# Patient Record
Sex: FEMALE | Race: WHITE | NOT HISPANIC OR LATINO
[De-identification: names, ages, dates, MRNs, and addresses within clinical notes are randomized per-mention and may not be internally consistent; named-entity substitution may affect disease eponyms.]

---

## 2023-05-05 PROBLEM — Z00.00 ENCOUNTER FOR PREVENTIVE HEALTH EXAMINATION: Status: ACTIVE | Noted: 2023-05-05

## 2023-05-09 ENCOUNTER — NON-APPOINTMENT (OUTPATIENT)
Age: 48
End: 2023-05-09

## 2023-05-12 ENCOUNTER — APPOINTMENT (OUTPATIENT)
Dept: BREAST CENTER | Facility: CLINIC | Age: 48
End: 2023-05-12
Payer: COMMERCIAL

## 2023-05-12 ENCOUNTER — NON-APPOINTMENT (OUTPATIENT)
Age: 48
End: 2023-05-12

## 2023-05-12 VITALS
HEART RATE: 83 BPM | OXYGEN SATURATION: 99 % | HEIGHT: 61 IN | BODY MASS INDEX: 37.76 KG/M2 | WEIGHT: 200 LBS | TEMPERATURE: 97.6 F

## 2023-05-12 DIAGNOSIS — Z86.39 PERSONAL HISTORY OF OTHER ENDOCRINE, NUTRITIONAL AND METABOLIC DISEASE: ICD-10-CM

## 2023-05-12 DIAGNOSIS — Z80.3 FAMILY HISTORY OF MALIGNANT NEOPLASM OF BREAST: ICD-10-CM

## 2023-05-12 PROCEDURE — 99205 OFFICE O/P NEW HI 60 MIN: CPT

## 2023-05-12 RX ORDER — LEVOTHYROXINE SODIUM 88 UG/1
88 TABLET ORAL
Refills: 0 | Status: ACTIVE | COMMUNITY

## 2023-05-12 NOTE — REASON FOR VISIT
[Initial Evaluation] : an initial evaluation [FreeTextEntry1] : The patient is a perimenopausal white female of [place] and [place] descent with a strong family history of breast cancer who was found to have suspicious calcifications in her right breast upper outer quadrant on screening mammography and stereotactic biopsy in May 2023 showed high-grade DCIS with comedonecrosis which was ER/TN positive.  She comes in now for a surgical second opinion.

## 2023-05-12 NOTE — PHYSICAL EXAM
[Normocephalic] : normocephalic [Atraumatic] : atraumatic [EOMI] : extra ocular movement intact [Supple] : supple [No Supraclavicular Adenopathy] : no supraclavicular adenopathy [No Cervical Adenopathy] : no cervical adenopathy [Examined in the supine and seated position] : examined in the supine and seated position [No dominant masses] : no dominant masses left breast [No Nipple Retraction] : no right nipple retraction [No Nipple Discharge] : no left nipple discharge [Breast Mass Left Breast ___cm] : no masses [Breast Nipple Inversion] : nipples not inverted [Breast Nipple Retraction] : nipples not retracted [Breast Nipple Flattening] : nipples not flattened [Breast Nipple Fissures] : nipples not fissured [Breast Abnormal Lactation (Galactorrhea)] : no galactorrhea [Breast Abnormal Secretion Bloody Fluid] : no bloody discharge [Breast Abnormal Secretion Serous Fluid] : no serous discharge [Breast Abnormal Secretion Opalescent Fluid] : no milky discharge [No Axillary Lymphadenopathy] : no left axillary lymphadenopathy [No Edema] : no edema [No Rashes] : no rashes [No Ulceration] : no ulceration [de-identified] : On exam, the patient has ptotic D-cup breasts.  On palpation, she has some scarring changes from her recent stereotactic core biopsy in the right breast upper outer quadrant with some bruising.  She has no other suspicious findings and no axillary, supraclavicular, or cervical adenopathy. [de-identified] : Bruising and scarring changes in the right breast upper outer quadrant from her recent stereotactic core biopsy showing DCIS. [de-identified] : Right breast upper outer quadrant biopsy site changes.

## 2023-05-12 NOTE — ADDENDUM
[FreeTextEntry1] : I spent greater than 75% of the consultation in face-to-face counseling and coordination care for this patient with a newly diagnosed right breast upper outer quadrant DCIS.

## 2023-05-12 NOTE — HISTORY OF PRESENT ILLNESS
[FreeTextEntry1] : The patient is a 47-year-old  perimenopausal white female of [place] and [place] descent.  She underwent menarche at age 14 and had her first child at age 31.  Her menses are somewhat irregular and she is having some vasomotor symptoms.  She has a strong family history with her maternal grandmother who had breast cancer at age 77 and her paternal grandmother had breast cancer in her 60s.  She has a maternal great-grandmother who also had breast cancer at an unknown age.  The patient underwent a screening mammography at [facility] on 2023 showing some new suspicious calcifications in her right breast upper outer quadrant.  Diagnostic mammography in 2023 also showed these calcifications and she underwent a stereotactic core biopsy on May 3, 2023 which showed extensive high-grade DCIS with comedonecrosis associated with calcifications which was ER positive at 100% and AR positive at 5% ("ar" clip).  She has sought other surgical opinions and comes in now for another opinion.

## 2023-05-17 ENCOUNTER — NON-APPOINTMENT (OUTPATIENT)
Age: 48
End: 2023-05-17

## 2023-05-17 ENCOUNTER — TRANSCRIPTION ENCOUNTER (OUTPATIENT)
Age: 48
End: 2023-05-17

## 2023-05-23 ENCOUNTER — RESULT REVIEW (OUTPATIENT)
Age: 48
End: 2023-05-23

## 2023-05-24 ENCOUNTER — NON-APPOINTMENT (OUTPATIENT)
Age: 48
End: 2023-05-24

## 2023-05-26 ENCOUNTER — NON-APPOINTMENT (OUTPATIENT)
Age: 48
End: 2023-05-26

## 2023-05-26 DIAGNOSIS — R93.89 ABNORMAL FINDINGS ON DIAGNOSTIC IMAGING OF OTHER SPECIFIED BODY STRUCTURES: ICD-10-CM

## 2023-05-30 ENCOUNTER — NON-APPOINTMENT (OUTPATIENT)
Age: 48
End: 2023-05-30

## 2023-05-30 ENCOUNTER — APPOINTMENT (OUTPATIENT)
Dept: PLASTIC SURGERY | Facility: CLINIC | Age: 48
End: 2023-05-30
Payer: COMMERCIAL

## 2023-05-30 VITALS — WEIGHT: 200 LBS | BODY MASS INDEX: 37.79 KG/M2

## 2023-05-30 PROCEDURE — 99203 OFFICE O/P NEW LOW 30 MIN: CPT

## 2023-05-31 NOTE — HISTORY OF PRESENT ILLNESS
[FreeTextEntry1] : 46 y/o female presents for initial consultation referred by Dr. Kelly for right breast reconstruction after partial mastectomy vs mastectomy\par Biopsy and MRI was done resulting in DCIS right breast\par Family history of breast cancer both maternal and paternal grandmothers\par No history of bleeding/ clotting disorders\par \par Bra Size 42DD, Bilateral macromastia with grade 3 ptosis. Breast soft, no palpable masses, no scars. Bilateral nipples everted with no signs of discharge.\par Abdomen NT, ND, no masses, no scars, + laxity, + adipose tissue\par \par Today we discussed all options for breast reconstruction including no reconstruction, local tissue transfer with left breast reduction and reconstruction using tissue expanders and implants as well as autologous reconstruction. The nature of each surgery, its risks, benefits, alternatives, expected postoperative course, recovery and long-term results were discussed in detail. All questions were answered.\par \par Ms. Coronado is most interested in segmental breast resection and reconstruction with a wise pattern tissue rearrangement. This is expected to result in a significant asymmetry between the breasts. Will also plan to perform contralateral breast reduction at the same time to preserve breast symmetry. Will coordinate surgery date with Dr. Kelly.\par \par \par \par \par

## 2023-06-02 ENCOUNTER — NON-APPOINTMENT (OUTPATIENT)
Age: 48
End: 2023-06-02

## 2023-06-06 ENCOUNTER — RESULT REVIEW (OUTPATIENT)
Age: 48
End: 2023-06-06

## 2023-06-08 ENCOUNTER — NON-APPOINTMENT (OUTPATIENT)
Age: 48
End: 2023-06-08

## 2023-06-16 ENCOUNTER — APPOINTMENT (OUTPATIENT)
Dept: PLASTIC SURGERY | Facility: CLINIC | Age: 48
End: 2023-06-16
Payer: COMMERCIAL

## 2023-06-16 ENCOUNTER — APPOINTMENT (OUTPATIENT)
Dept: PLASTIC SURGERY | Facility: CLINIC | Age: 48
End: 2023-06-16

## 2023-06-16 PROCEDURE — 99451 NTRPROF PH1/NTRNET/EHR 5/>: CPT

## 2023-06-16 PROCEDURE — 99213 OFFICE O/P EST LOW 20 MIN: CPT | Mod: 95

## 2023-06-16 NOTE — HISTORY OF PRESENT ILLNESS
[FreeTextEntry1] : Patient name: MARY JANE TAPIA \par : Oct 26 1975 \par Date:2023 \par Attending:Dr. Bates\par \par \par HPI: Patient presented for preop for right breast tissue rearrangement and possible left breast reduction on 23\par \par \par Allergies: Calamine Lotion\par \par Medications Prescribed: Percocet\par \par \par ROS: Complete 14 point review of systems negative except pertinent items reviewed in the HPI. Other Non-contributory items reviewed in our new patient questionnaire and we have submitted it to be scanned into the medical record.\par \par PE: Completed at time of in office evaluation\par \par Assessment/Plan: We have discussed pre and post operative course including but not limited to instructions, recovery limitations and expectations, ERAS protocol, NPO status, transportation home, post op medications and pain management, benefits and risks of the surgical procedure. Pre op labs reviewed. They patient would like to proceed with surgery as discussed and planned.\par \par \par Rosibel Stiles NP\par \par \par

## 2023-06-16 NOTE — REASON FOR VISIT
[Home] : at home, [unfilled] , at the time of the visit. [Medical Office: ([facility])___] : at the medical office located in  [Parents] : parents [Patient] : the patient [Follow-Up: _____] : a [unfilled] follow-up visit

## 2023-06-18 ENCOUNTER — RESULT REVIEW (OUTPATIENT)
Age: 48
End: 2023-06-18

## 2023-06-21 ENCOUNTER — RESULT REVIEW (OUTPATIENT)
Age: 48
End: 2023-06-21

## 2023-06-21 ENCOUNTER — NON-APPOINTMENT (OUTPATIENT)
Age: 48
End: 2023-06-21

## 2023-06-23 ENCOUNTER — RESULT REVIEW (OUTPATIENT)
Age: 48
End: 2023-06-23

## 2023-06-23 ENCOUNTER — APPOINTMENT (OUTPATIENT)
Dept: BREAST CENTER | Facility: HOSPITAL | Age: 48
End: 2023-06-23

## 2023-06-26 ENCOUNTER — TRANSCRIPTION ENCOUNTER (OUTPATIENT)
Age: 48
End: 2023-06-26

## 2023-06-27 ENCOUNTER — NON-APPOINTMENT (OUTPATIENT)
Age: 48
End: 2023-06-27

## 2023-06-28 ENCOUNTER — NON-APPOINTMENT (OUTPATIENT)
Age: 48
End: 2023-06-28

## 2023-06-28 ENCOUNTER — APPOINTMENT (OUTPATIENT)
Dept: PLASTIC SURGERY | Facility: CLINIC | Age: 48
End: 2023-06-28
Payer: COMMERCIAL

## 2023-06-28 DIAGNOSIS — R21 RASH AND OTHER NONSPECIFIC SKIN ERUPTION: ICD-10-CM

## 2023-06-28 PROCEDURE — 99024 POSTOP FOLLOW-UP VISIT: CPT

## 2023-06-30 ENCOUNTER — APPOINTMENT (OUTPATIENT)
Dept: BREAST CENTER | Facility: CLINIC | Age: 48
End: 2023-06-30
Payer: COMMERCIAL

## 2023-06-30 PROCEDURE — 99024 POSTOP FOLLOW-UP VISIT: CPT

## 2023-06-30 NOTE — REASON FOR VISIT
[Post Op: _________] : a [unfilled] post op visit [FreeTextEntry1] : The patient is a perimenopausal white female of Estonian and Welsh descent with a strong family history of breast cancer who was found to have suspicious calcifications in her right breast upper outer quadrant on screening mammography and stereotactic biopsy in May 2023 showed high-grade DCIS with comedonecrosis which was ER/ME positive.  MRI performed in May 2023 showed the biopsy-proven 12:00 DCIS but significant non-mass-like enhancement extending 10 cm posteriorly and separate more posterior MRI guided core biopsy performed in June 2023 showed more extensive DCIS again in this 12:00 but posterior region.  A right breast bracketed partial mastectomy with sentinel lymph node biopsy and tissue rearrangement closure by Dr. Bates was performed on June 23, 2023.  Final pathology showed intermediate to high-grade DCIS with 3 sentinel lymph nodes with 1 having some rare isolated tumor cells but there was no invasive cancer and margins were free but she did have an anterior margin less than 1 mm.  She comes in now for postop follow-up.

## 2023-06-30 NOTE — HISTORY OF PRESENT ILLNESS
[FreeTextEntry1] : The patient is a 47-year-old  perimenopausal white female of Israeli and Sudanese descent.  She underwent menarche at age 14 and had her first child at age 31.  Her menses are somewhat irregular and she is having some vasomotor symptoms.  She has a strong family history with her maternal grandmother who had breast cancer at age 77 and her paternal grandmother had breast cancer in her 60s.  She has a maternal great-grandmother who also had breast cancer at an unknown age.  The patient underwent a screening mammography at [facility] on 2023 showing some new suspicious calcifications in her right breast upper outer quadrant.  Diagnostic mammography in 2023 also showed these calcifications and she underwent a stereotactic core biopsy on May 3, 2023 which showed extensive high-grade DCIS with comedonecrosis associated with calcifications which was ER positive at 100% and MT positive at 5% ("ar" clip).  She sought other surgical opinions.  She underwent a bilateral breast MRI on May 24, 2023 showing the biopsy-proven DCIS in the right breast 12:00 region with significant clumped non-mass-like enhancement extending posteriorly 10.3 cm for which a separate MRI guided core biopsy was recommended.  She also had some findings in the right lobe of the liver.  Abdominal MRI showed this right hepatic lobe density to be consistent with a hemangioma.  Separate posterior right breast 12:00 MRI guided core biopsy was performed on 2023 at [place] which showed more extensive DCIS.  She was still felt to be a candidate for partial mastectomy and opted on breast conservation and was seen by Dr. Bates preoperatively who felt she is a good candidate for a reduction mastopexy versus tissue rearrangement.  She underwent a right breast bracketed partial mastectomy with tissue rearrangement closure and sentinel lymph node biopsy performed on 2023 with Dr. Bates.  The final pathology showed intermediate to high-grade DCIS and there were 3 sentinel lymph nodes with 1 having some rare isolated tumor cells.  This would be considered node-negative and still considered stage 0.  There was no invasive cancer but anterior margin was less than 1 mm.  She comes in now for postop follow-up.

## 2023-06-30 NOTE — ASSESSMENT
[FreeTextEntry1] : The patient is a 47-year-old  perimenopausal white female of [place] and [place] descent.  She underwent menarche at age 14 and had her first child at age 31.  Her menses are somewhat irregular and she is having some vasomotor symptoms.  She has a strong family history with her maternal grandmother who had breast cancer at age 77 and her paternal grandmother had breast cancer in her 60s.  She has a maternal great-grandmother who also had breast cancer at an unknown age.  The patient underwent a screening mammography at [facility] on 2023 showing some new suspicious calcifications in her right breast upper outer quadrant.  Diagnostic mammography in 2023 also showed these calcifications and she underwent a stereotactic core biopsy on May 3, 2023 which showed extensive high-grade DCIS with comedonecrosis associated with calcifications which was ER positive at 100% and VT positive at 5% ("ar" clip).  I reviewed her imaging on CD-ROM and indeed she has the suspicious right breast upper outer quadrant calcifications with another area of calcifications about 1 cm medial to the biopsied region.  I do not see any suspicious findings in the left breast.  On exam she just has postbiopsy changes in the right breast upper outer quadrant and no suspicious adenopathy.  I spoke to the patient with her entire family with her  as well as mother and father involved in the discussion.  She understands that this is a stage 0 cancer at this point but is high-grade and has a chance of having some associated invasive component.  I also spoke to her about the possible need for a lymph node evaluation with a sentinel lymph node biopsy and recommended it at the same time of the partial mastectomy due to a possible 20 to 30% risk of being upstaged to an invasive cancer and I recommended the sentinel node biopsy at the time of surgery.  Genetic panel testing performed at [place] was reportedly negative.   She underwent a bilateral breast MRI on May 24, 2023 showing the biopsy-proven DCIS in the right breast 12:00 region with significant clumped non-mass-like enhancement extending posteriorly 10.3 cm for which a separate MRI guided core biopsy was recommended.  She also had some findings in the right lobe of the liver.  Abdominal MRI showed this right hepatic lobe density to be consistent with a hemangioma.  Separate posterior right breast 12:00 MRI guided core biopsy was performed on 2023 at [place] which showed more extensive DCIS.  She was still felt to be a candidate for partial mastectomy and opted on breast conservation and was seen by Dr. Bates preoperatively who felt she is a good candidate for a reduction mastopexy versus tissue rearrangement.  She underwent a right breast bracketed partial mastectomy with tissue rearrangement closure and sentinel lymph node biopsy performed on 2023 with Dr. Bates.  The final pathology showed intermediate to high-grade DCIS and there were 3 sentinel lymph nodes with 1 having some rare isolated tumor cells.  This would be considered node-negative and still considered stage 0.  There was no invasive cancer but anterior margin was less than 1 mm.  On exam today, she is healing well from her right breast partial mastectomy and tissue rearrangement with sentinel lymph node biopsy.  I did aspirate about 70 cc of seroma from underneath the wide excision site under ultrasound guidance in the office today. I went over the pathology with the patient and gave her a copy of the report for her records.  Given this close anterior margin I would like to take her back to the operating room for further anterior margin excision.  I had to undermine the superior aspect of the nipple areolar complex in this case preventing a full reduction mastopexy and tissue rearrangement closure was performed by plastic surgery which will make it easier to find this anterior margin.  Did speak to Dr. Carias regarding his tissue rearrangement and he feels that I could just reopen the incision and get an anterior margin without fully disturbing the tissue rearrangement underneath.  The patient understands the need for this further reexcision and I will get her back on the schedule as soon as possible.  She understands that since all her disease was intraductal, chemotherapy would not be recommended.  I would like her to see the medical oncologist however postoperatively just to talk about endocrine therapy since this DCIS was ER positive greater than 95% and VT positive at 40% on the final pathology.  I did place fiducial titanium clips at the base of the excision to aid the radiation oncologist and she understands the need for postoperative external beam radiation.

## 2023-06-30 NOTE — PHYSICAL EXAM
[Normocephalic] : normocephalic [Atraumatic] : atraumatic [EOMI] : extra ocular movement intact [Supple] : supple [No Supraclavicular Adenopathy] : no supraclavicular adenopathy [No Cervical Adenopathy] : no cervical adenopathy [Examined in the supine and seated position] : examined in the supine and seated position [No dominant masses] : no dominant masses left breast [No Nipple Retraction] : no right nipple retraction [No Nipple Discharge] : no left nipple discharge [Breast Mass Left Breast ___cm] : no masses [No Axillary Lymphadenopathy] : no left axillary lymphadenopathy [No Edema] : no edema [No Rashes] : no rashes [No Ulceration] : no ulceration [Breast Mass Right Breast ___cm] : no masses [Breast Nipple Inversion] : nipples not inverted [Breast Nipple Retraction] : nipples not retracted [Breast Nipple Flattening] : nipples not flattened [Breast Nipple Fissures] : nipples not fissured [Breast Abnormal Lactation (Galactorrhea)] : no galactorrhea [Breast Abnormal Secretion Bloody Fluid] : no bloody discharge [Breast Abnormal Secretion Serous Fluid] : no serous discharge [Breast Abnormal Secretion Opalescent Fluid] : no milky discharge [de-identified] : Status post bracketed partial mastectomy with tissue rearrangement closure by plastic surgery and right axillary sentinel lymph node biopsy.  Wounds are healing well with no signs of any infection or hematoma and I did aspirate 70 cc of seroma from underneath the wide excision site in the office today under ultrasound guidance. [de-identified] : On exam, the patient is doing well after her right breast partial mastectomy with sentinel lymph node biopsy and tissue rearrangement closure.  The wounds are healing well with no signs of any infection or hematoma.  I did aspirate 70 cc of seroma from underneath the wide excision site in the office under ultrasound guidance today.

## 2023-07-05 NOTE — HISTORY OF PRESENT ILLNESS
[FreeTextEntry1] : 46 y/o female presents POD#5 days s/p right breast tissue rearrangement on 6/23/23. Patient denies any f/c/n/v. Patient complaining of pruritic rash of chest. She otherwise is doing well, no complaints offered\par \par PE: Right breast healing well. Incision C/D/I. No collection of infection. No wound breakdown\par Diffuse raised erythematous bulbous rash chest wall\par \par Rx Clobetasol ointment and Benadryl\par If does not resolve patient to see derm\par Reviewed care and course\par Answered all questions\par RTC 2 weeks.\par

## 2023-07-06 ENCOUNTER — NON-APPOINTMENT (OUTPATIENT)
Age: 48
End: 2023-07-06

## 2023-07-10 ENCOUNTER — NON-APPOINTMENT (OUTPATIENT)
Age: 48
End: 2023-07-10

## 2023-07-11 ENCOUNTER — APPOINTMENT (OUTPATIENT)
Dept: PLASTIC SURGERY | Facility: CLINIC | Age: 48
End: 2023-07-11

## 2023-07-17 ENCOUNTER — APPOINTMENT (OUTPATIENT)
Dept: BREAST CENTER | Facility: HOSPITAL | Age: 48
End: 2023-07-17

## 2023-07-17 ENCOUNTER — RESULT REVIEW (OUTPATIENT)
Age: 48
End: 2023-07-17

## 2023-07-22 ENCOUNTER — NON-APPOINTMENT (OUTPATIENT)
Age: 48
End: 2023-07-22

## 2023-07-22 NOTE — REASON FOR VISIT
[Post Op: _________] : a [unfilled] post op visit [FreeTextEntry1] : The patient is a perimenopausal white female of Occ[place] and Irish descent with a strong family history of breast cancer who was found to have suspicious calcifications in her right breast upper outer quadrant on screening mammography and stereotactic biopsy in May 2023 showed high-grade DCIS with comedonecrosis which was ER/KS positive.  MRI performed in May 2023 showed the biopsy-proven 12:00 DCIS but significant non-mass-like enhancement extending 10 cm posteriorly and separate more posterior MRI guided core biopsy performed in June 2023 showed more extensive DCIS again in this 12:00 but posterior region.  A right breast bracketed partial mastectomy with sentinel lymph node biopsy and tissue rearrangement closure by Dr. Bates was performed on June 23, 2023.  Final pathology showed intermediate to high-grade DCIS with 3 sentinel lymph nodes with 1 having some rare isolated tumor cells but there was no invasive cancer and margins were free but she did have an anterior margin less than 1 mm.  She underwent reexcision of the anterior margin on July 17, 2023.  She comes in now for postop follow-up.

## 2023-07-22 NOTE — ASSESSMENT
[FreeTextEntry1] : The patient is a 47-year-old  perimenopausal white female of [place] and [place] descent.  She underwent menarche at age 14 and had her first child at age 31.  Her menses are somewhat irregular and she is having some vasomotor symptoms.  She has a strong family history with her maternal grandmother who had breast cancer at age 77 and her paternal grandmother had breast cancer in her 60s.  She has a maternal great-grandmother who also had breast cancer at an unknown age.  The patient underwent a screening mammography at [facility] on 2023 showing some new suspicious calcifications in her right breast upper outer quadrant.  Diagnostic mammography in 2023 also showed these calcifications and she underwent a stereotactic core biopsy on May 3, 2023 which showed extensive high-grade DCIS with comedonecrosis associated with calcifications which was ER positive at 100% and ID positive at 5% ("ar" clip).  I reviewed her imaging on CD-ROM and indeed she has the suspicious right breast upper outer quadrant calcifications with another area of calcifications about 1 cm medial to the biopsied region.  I do not see any suspicious findings in the left breast.  On exam she just has postbiopsy changes in the right breast upper outer quadrant and no suspicious adenopathy.  I spoke to the patient with her entire family with her  as well as mother and father involved in the discussion.  She understands that this is a stage 0 cancer at this point but is high-grade and has a chance of having some associated invasive component.  I also spoke to her about the possible need for a lymph node evaluation with a sentinel lymph node biopsy and recommended it at the same time of the partial mastectomy due to a possible 20 to 30% risk of being upstaged to an invasive cancer and I recommended the sentinel node biopsy at the time of surgery.  Genetic panel testing performed at [place] was reportedly negative.   She underwent a bilateral breast MRI on May 24, 2023 showing the biopsy-proven DCIS in the right breast 12:00 region with significant clumped non-mass-like enhancement extending posteriorly 10.3 cm for which a separate MRI guided core biopsy was recommended.  She also had some findings in the right lobe of the liver.  Abdominal MRI showed this right hepatic lobe density to be consistent with a hemangioma.  Separate posterior right breast 12:00 MRI guided core biopsy was performed on 2023 at [place] which showed more extensive DCIS.  She was still felt to be a candidate for partial mastectomy and opted on breast conservation and was seen by Dr. Bates preoperatively who felt she is a good candidate for a reduction mastopexy versus tissue rearrangement.  She underwent a right breast bracketed partial mastectomy with tissue rearrangement closure and sentinel lymph node biopsy performed on 2023 with Dr. Bates.  The final pathology showed intermediate to high-grade DCIS and there were 3 sentinel lymph nodes with 1 having some rare isolated tumor cells.  This would be considered node-negative and still considered stage 0.  There was no invasive cancer but anterior margin was less than 1 mm.  I went over the pathology with the patient and advised her to undergo reexcision of the anterior margin which was performed on 2023.  On exam today, she is healing well from her right breast partial mastectomy and tissue rearrangement with sentinel lymph node biopsy and then reexcision of the anterior margin on a separate date.  She did have a recurrent seroma at the time of the surgery and I performed a tissue rearrangement closure to close off the seroma cavity at the time of the reexcision.  Final pathology showed ???????? and I went over the pathology with the patient and gave her a copy of the report for her records.   She understands that since all her disease was intraductal, chemotherapy would not be recommended.  I would like her to see the medical oncologist however postoperatively just to talk about endocrine therapy since this DCIS was ER positive greater than 95% and ID positive at 40% on the final pathology.  I did place fiducial titanium clips at the base of the excision to aid the radiation oncologist and she understands the need for postoperative external beam radiation.  I would like to see her again in 2 weeks for another wound evaluation and she was given Dr. Malin as well as Dr. Harris's cards to make medical and radiation oncology appointments.

## 2023-07-22 NOTE — HISTORY OF PRESENT ILLNESS
[FreeTextEntry1] : The patient is a 47-year-old  perimenopausal white female of [place] and [place]ogol[place] descent.  She underwent menarche at age 14 and had her first child at age 31.  Her menses are somewhat irregular and she is having some vasomotor symptoms.  She has a strong family history with her maternal grandmother who had breast cancer at age 77 and her paternal grandmother had breast cancer in her 60s.  She has a maternal great-grandmother who also had breast cancer at an unknown age.  The patient underwent a screening mammography at [facility] on 2023 showing some new suspicious calcifications in her right breast upper outer quadrant.  Diagnostic mammography in 2023 also showed these calcifications and she underwent a stereotactic core biopsy on May 3, 2023 which showed extensive high-grade DCIS with comedonecrosis associated with calcifications which was ER positive at 100% and [place] positive at 5% ("ar" clip).  She sought other surgical opinions.  She underwent a bilateral breast MRI on May 24, 2023 showing the biopsy-proven DCIS in the right breast 12:00 region with significant clumped non-mass-like enhancement extending posteriorly 10.3 cm for which a separate MRI guided core biopsy was recommended.  She also had some findings in the right lobe of the liver.  Abdominal MRI showed this right hepatic lobe density to be consistent with a hemangioma.  Separate posterior right breast 12:00 MRI guided core biopsy was performed on 2023 at [place] which showed more extensive DCIS.  She was still felt to be a candidate for partial mastectomy and opted on breast conservation and was seen by Dr. Bates preoperatively who felt she is a good candidate for a reduction mastopexy versus tissue rearrangement.  She underwent a right breast bracketed partial mastectomy with tissue rearrangement closure and sentinel lymph node biopsy performed on 2023 with Dr. Bates.  The final pathology showed intermediate to high-grade DCIS and there were 3 sentinel lymph nodes with 1 having some rare isolated tumor cells.  This would be considered node-negative and still considered stage 0.  There was no invasive cancer but anterior margin was less than 1 mm.  I took her back to the operating room for reexcision of the anterior margin on 2023.  She comes in now for postop follow-up.

## 2023-07-22 NOTE — PHYSICAL EXAM
[Normocephalic] : normocephalic [Atraumatic] : atraumatic [EOMI] : extra ocular movement intact [Supple] : supple [No Supraclavicular Adenopathy] : no supraclavicular adenopathy [No Cervical Adenopathy] : no cervical adenopathy [Examined in the supine and seated position] : examined in the supine and seated position [No dominant masses] : no dominant masses left breast [No Nipple Retraction] : no right nipple retraction [No Nipple Discharge] : no left nipple discharge [Breast Mass Right Breast ___cm] : no masses [Breast Mass Left Breast ___cm] : no masses [Breast Nipple Inversion] : nipples not inverted [Breast Nipple Retraction] : nipples not retracted [Breast Nipple Flattening] : nipples not flattened [Breast Nipple Fissures] : nipples not fissured [Breast Abnormal Lactation (Galactorrhea)] : no galactorrhea [Breast Abnormal Secretion Bloody Fluid] : no bloody discharge [Breast Abnormal Secretion Serous Fluid] : no serous discharge [Breast Abnormal Secretion Opalescent Fluid] : no milky discharge [No Axillary Lymphadenopathy] : no left axillary lymphadenopathy [No Edema] : no edema [No Rashes] : no rashes [No Ulceration] : no ulceration [de-identified] : On exam, the patient is doing well after her right breast partial mastectomy with sentinel lymph node biopsy and tissue rearrangement closure.  The wounds are healing well with no signs of any infection or hematoma.  I did aspirate ???????? 70 cc of seroma from underneath the wide excision site in the office under ultrasound guidance today. [de-identified] : Status post bracketed partial mastectomy with tissue rearrangement closure by plastic surgery and right axillary sentinel lymph node biopsy.  She was then brought back for reexcision of the close anterior margin for DCIS.  Her wounds are healing well with no signs of any infection or hematoma and I did aspirate ???????? 70 cc of seroma from underneath the wide excision site in the office today under ultrasound guidance.

## 2023-07-26 NOTE — ASSESSMENT
[FreeTextEntry1] : The patient is a 47-year-old  perimenopausal white female of [place] and [place]ian descent.  She underwent menarche at age 14 and had her first child at age 31.  Her menses are somewhat irregular and she is having some vasomotor symptoms.  She has a strong family history with her maternal grandmother who had breast cancer at age 77 and her paternal grandmother had breast cancer in her 60s.  She has a maternal great-grandmother who also had breast cancer at an unknown age.  The patient underwent a screening mammography at [facility] on 2023 showing some new suspicious calcifications in her right breast upper outer quadrant.  Diagnostic mammography in 2023 also showed these calcifications and she underwent a stereotactic core biopsy on May 3, 2023 which showed extensive high-grade DCIS with comedonecrosis associated with calcifications which was ER positive at 100% and MA positive at 5% ("ar" clip).  I reviewed her imaging on CD-ROM and indeed she has the suspicious right breast upper outer quadrant calcifications with another area of calcifications about 1 cm medial to the biopsied region.  I do not see any suspicious findings in the left breast.  On exam she just has postbiopsy changes in the right breast upper outer quadrant and no suspicious adenopathy.  I spoke to the patient with her entire family with her  as well as mother and father involved in the discussion.  She understands that this is a stage 0 cancer at this point but is high-grade and has a chance of having some associated invasive component.  I also spoke to her about the possible need for a lymph node evaluation with a sentinel lymph node biopsy and recommended it at the same time of the partial mastectomy due to a possible 20 to 30% risk of being upstaged to an invasive cancer and I recommended the sentinel node biopsy at the time of surgery.  Genetic panel testing performed at [place] was reportedly negative.   She underwent a bilateral breast MRI on May 24, 2023 showing the biopsy-proven DCIS in the right breast 12:00 region with significant clumped non-mass-like enhancement extending posteriorly 10.3 cm for which a separate MRI guided core biopsy was recommended.  She also had some findings in the right lobe of the liver.  Abdominal MRI showed this right hepatic lobe density to be consistent with a hemangioma.  Separate posterior right breast 12:00 MRI guided core biopsy was performed on 2023 at [place] which showed more extensive DCIS.  She was still felt to be a candidate for partial mastectomy and opted on breast conservation and was seen by Dr. Bates preoperatively who felt she is a good candidate for a reduction mastopexy versus tissue rearrangement.  She underwent a right breast bracketed partial mastectomy with tissue rearrangement closure and sentinel lymph node biopsy performed on 2023 with Dr. Bates.  The final pathology showed intermediate to high-grade DCIS and there were 3 sentinel lymph nodes with 1 having some rare isolated tumor cells.  This would be considered node-negative and still considered stage 0.  There was no invasive cancer but anterior margin was less than 1 mm.  I went over the pathology with the patient and advised her to undergo reexcision of the anterior margin which was performed on 2023.  On exam today, she is healing well from her right breast partial mastectomy and tissue rearrangement with sentinel lymph node biopsy and then reexcision of the anterior margin on a separate date.  She did have a recurrent seroma at the time of the surgery and I performed a tissue rearrangement closure to close off the seroma cavity at the time of the reexcision.  Final pathology showed some usual duct hyperplasia and fat necrosis and I went over the pathology with the patient and gave her a copy of the report for her records.   She understands that since all her disease was intraductal, chemotherapy would not be recommended.  I would like her to see the medical oncologist however postoperatively just to talk about endocrine therapy since this DCIS was ER positive greater than 95% and MA positive at 40% on the final pathology.  I did place fiducial titanium clips at the base of the excision to aid the radiation oncologist and she understands the need for postoperative external beam radiation.  I would like to see her again in 2 weeks for another wound evaluation and she was given Dr. Malin as well as Dr. Harris's cards to make medical and radiation oncology appointments.

## 2023-07-26 NOTE — PHYSICAL EXAM
[Normocephalic] : normocephalic [Atraumatic] : atraumatic [EOMI] : extra ocular movement intact [Supple] : supple [No Supraclavicular Adenopathy] : no supraclavicular adenopathy [No Cervical Adenopathy] : no cervical adenopathy [Examined in the supine and seated position] : examined in the supine and seated position [No dominant masses] : no dominant masses left breast [No Nipple Retraction] : no right nipple retraction [No Nipple Discharge] : no left nipple discharge [Breast Mass Right Breast ___cm] : no masses [Breast Mass Left Breast ___cm] : no masses [Breast Nipple Inversion] : nipples not inverted [Breast Nipple Flattening] : nipples not flattened [Breast Nipple Retraction] : nipples not retracted [Breast Nipple Fissures] : nipples not fissured [Breast Abnormal Lactation (Galactorrhea)] : no galactorrhea [Breast Abnormal Secretion Bloody Fluid] : no bloody discharge [Breast Abnormal Secretion Opalescent Fluid] : no milky discharge [Breast Abnormal Secretion Serous Fluid] : no serous discharge [No Axillary Lymphadenopathy] : no left axillary lymphadenopathy [No Edema] : no edema [No Rashes] : no rashes [No Ulceration] : no ulceration [de-identified] : On exam, the patient is doing well after her right breast partial mastectomy with sentinel lymph node biopsy and tissue rearrangement closure and later reexcision of an anterior margin.  The wounds are healing well with no signs of any infection or hematoma.  I did aspirate ???????? 70 cc of seroma from underneath the wide excision site in the office under ultrasound guidance today. [de-identified] : Status post bracketed partial mastectomy with tissue rearrangement closure by plastic surgery and right axillary sentinel lymph node biopsy.  She was then brought back for reexcision of the close anterior margin for DCIS which turned out to be benign.  Her wounds are healing well with no signs of any infection or hematoma and I did aspirate ???????? 70 cc of seroma from underneath the wide excision site in the office today under ultrasound guidance.

## 2023-07-26 NOTE — HISTORY OF PRESENT ILLNESS
[FreeTextEntry1] : The patient is a 47-year-old  perimenopausal white female of [place] and [place] descent.  She underwent menarche at age 14 and had her first child at age 31.  Her menses are somewhat irregular and she is having some vasomotor symptoms.  She has a strong family history with her maternal grandmother who had breast cancer at age 77 and her paternal grandmother had breast cancer in her 60s.  She has a maternal great-grandmother who also had breast cancer at an unknown age.  The patient underwent a screening mammography at [facility] on 2023 showing some new suspicious calcifications in her right breast upper outer quadrant.  Diagnostic mammography in 2023 also showed these calcifications and she underwent a stereotactic core biopsy on May 3, 2023 which showed extensive high-grade DCIS with comedonecrosis associated with calcifications which was ER positive at 100% and KS positive at 5% ("ar" clip).  She sought other surgical opinions.  She underwent a bilateral breast MRI on May 24, 2023 showing the biopsy-proven DCIS in the right breast 12:00 region with significant clumped non-mass-like enhancement extending posteriorly 10.3 cm for which a separate MRI guided core biopsy was recommended.  She also had some findings in the right lobe of the liver.  Abdominal MRI showed this right hepatic lobe density to be consistent with a hemangioma.  Separate posterior right breast 12:00 MRI guided core biopsy was performed on 2023 at [place] which showed more extensive DCIS.  She was still felt to be a candidate for partial mastectomy and opted on breast conservation and was seen by Dr. Bates preoperatively who felt she is a good candidate for a reduction mastopexy versus tissue rearrangement.  She underwent a right breast bracketed partial mastectomy with tissue rearrangement closure and sentinel lymph node biopsy performed on 2023 with Dr. Bates.  The final pathology showed intermediate to high-grade DCIS and there were 3 sentinel lymph nodes with 1 having some rare isolated tumor cells.  This would be considered node-negative and still considered stage 0.  There was no invasive cancer but anterior margin was less than 1 mm.  I took her back to the operating room for reexcision of the anterior margin on 2023 and the final pathology was benign just showing some usual duct hyperplasia and fat necrosis.  She comes in now for postop follow-up.

## 2023-07-26 NOTE — REASON FOR VISIT
[Post Op: _________] : a [unfilled] post op visit [FreeTextEntry1] : The patient is a perimenopausal white female of Korean and Latvian descent with a strong family history of breast cancer who was found to have suspicious calcifications in her right breast upper outer quadrant on screening mammography and stereotactic biopsy in May 2023 showed high-grade DCIS with comedonecrosis which was ER/NY positive.  MRI performed in May 2023 showed the biopsy-proven 12:00 DCIS but significant non-mass-like enhancement extending 10 cm posteriorly and separate more posterior MRI guided core biopsy performed in June 2023 showed more extensive DCIS again in this 12:00 but posterior region.  A right breast bracketed partial mastectomy with sentinel lymph node biopsy and tissue rearrangement closure by Dr. Bates was performed on June 23, 2023.  Final pathology showed intermediate to high-grade DCIS with 3 sentinel lymph nodes with 1 having some rare isolated tumor cells but there was no invasive cancer and margins were free but she did have an anterior margin less than 1 mm.  She underwent reexcision of the anterior margin on July 17, 2023 which was benign just showing some usual duct hyperplasia and scarring changes.  She comes in now for postop follow-up.

## 2023-08-01 ENCOUNTER — APPOINTMENT (OUTPATIENT)
Dept: BREAST CENTER | Facility: CLINIC | Age: 48
End: 2023-08-01
Payer: COMMERCIAL

## 2023-08-01 DIAGNOSIS — R92.0 MAMMOGRAPHIC MICROCALCIFICATION FOUND ON DIAGNOSTIC IMAGING OF BREAST: ICD-10-CM

## 2023-08-01 PROCEDURE — 99024 POSTOP FOLLOW-UP VISIT: CPT

## 2023-08-01 NOTE — PHYSICAL EXAM
[Normocephalic] : normocephalic [Atraumatic] : atraumatic [EOMI] : extra ocular movement intact [Supple] : supple [No Supraclavicular Adenopathy] : no supraclavicular adenopathy [No Cervical Adenopathy] : no cervical adenopathy [Examined in the supine and seated position] : examined in the supine and seated position [No dominant masses] : no dominant masses left breast [No Nipple Discharge] : no left nipple discharge [Breast Mass Right Breast ___cm] : no masses [Breast Mass Left Breast ___cm] : no masses [No Axillary Lymphadenopathy] : no left axillary lymphadenopathy [No Edema] : no edema [No Rashes] : no rashes [No Ulceration] : no ulceration [No dominant masses] : no dominant masses in right breast  [No Nipple Retraction] : no left nipple retraction [Breast Nipple Inversion] : nipples not inverted [Breast Nipple Retraction] : nipples not retracted [Breast Nipple Flattening] : nipples not flattened [Breast Nipple Fissures] : nipples not fissured [Breast Abnormal Lactation (Galactorrhea)] : no galactorrhea [Breast Abnormal Secretion Bloody Fluid] : no bloody discharge [Breast Abnormal Secretion Serous Fluid] : no serous discharge [Breast Abnormal Secretion Opalescent Fluid] : no milky discharge [de-identified] : On exam, the patient is doing well after her right breast partial mastectomy with sentinel lymph node biopsy and tissue rearrangement closure and later reexcision of an anterior margin.  The wounds are healing well with no signs of any infection or hematoma.  I did aspirate 5 cc of seroma from underneath the wide excision site in the office today. [de-identified] : Status post bracketed partial mastectomy with tissue rearrangement closure by plastic surgery and right axillary sentinel lymph node biopsy.  She was then brought back for reexcision of the close anterior margin for DCIS which turned out to be benign.  Her wounds are healing well with no signs of any infection or hematoma and I did aspirate 5 cc of seroma from underneath the wide excision site in the office today.

## 2023-08-01 NOTE — REASON FOR VISIT
[Post Op: _________] : a [unfilled] post op visit [FreeTextEntry1] : The patient is a perimenopausal white female of English and Vietnamese descent with a strong family history of breast cancer who was found to have suspicious calcifications in her right breast upper outer quadrant on screening mammography and stereotactic biopsy in May 2023 showed high-grade DCIS with comedonecrosis which was ER/WA positive.  MRI performed in May 2023 showed the biopsy-proven 12:00 DCIS but significant non-mass-like enhancement extending 10 cm posteriorly and separate more posterior MRI guided core biopsy performed in June 2023 showed more extensive DCIS again in this 12:00 but posterior region.  A right breast bracketed partial mastectomy with sentinel lymph node biopsy and tissue rearrangement closure by Dr. Bates was performed on June 23, 2023.  Final pathology showed intermediate to high-grade DCIS with 3 sentinel lymph nodes with 1 having some rare isolated tumor cells but there was no invasive cancer and margins were free but she did have an anterior margin less than 1 mm.  She underwent reexcision of the anterior margin on July 17, 2023 which was benign just showing some usual duct hyperplasia and scarring changes.  She comes in now for postop follow-up.

## 2023-08-01 NOTE — HISTORY OF PRESENT ILLNESS
[FreeTextEntry1] : The patient is a 47-year-old  perimenopausal white female of Citizen of [place] and [place] descent.  She underwent menarche at age 14 and had her first child at age 31.  Her menses are somewhat irregular and she is having some vasomotor symptoms.  She has a strong family history with her maternal grandmother who had breast cancer at age 77 and her paternal grandmother had breast cancer in her 60s.  She has a maternal great-grandmother who also had breast cancer at an unknown age.  The patient underwent a screening mammography at [facility] on 2023 showing some new suspicious calcifications in her right breast upper outer quadrant.  Diagnostic mammography in 2023 also showed these calcifications and she underwent a stereotactic core biopsy on May 3, 2023 which showed extensive high-grade DCIS with comedonecrosis associated with calcifications which was ER positive at 100% and TN positive at 5% ("ar" clip).  She sought other surgical opinions.  She underwent a bilateral breast MRI on May 24, 2023 showing the biopsy-proven DCIS in the right breast 12:00 region with significant clumped non-mass-like enhancement extending posteriorly 10.3 cm for which a separate MRI guided core biopsy was recommended.  She also had some findings in the right lobe of the liver.  Abdominal MRI showed this right hepatic lobe density to be consistent with a hemangioma.  Separate posterior right breast 12:00 MRI guided core biopsy was performed on 2023 at [place] which showed more extensive DCIS.  She was still felt to be a candidate for partial mastectomy and opted on breast conservation and was seen by Dr. Bates preoperatively who felt she is a good candidate for a reduction mastopexy versus tissue rearrangement.  She underwent a right breast bracketed partial mastectomy with tissue rearrangement closure and sentinel lymph node biopsy performed on 2023 with Dr. Bates.  The final pathology showed intermediate to high-grade DCIS and there were 3 sentinel lymph nodes with 1 having some rare isolated tumor cells.  This would be considered node-negative and still considered stage 0.  There was no invasive cancer but anterior margin was less than 1 mm.  I took her back to the operating room for reexcision of the anterior margin on 2023 and the final pathology was benign just showing some usual duct hyperplasia and fat necrosis.  She comes in now for postop follow-up.

## 2023-08-01 NOTE — ASSESSMENT
[FreeTextEntry1] : The patient is a 47-year-old  perimenopausal white female of Indian and [place] descent.  She underwent menarche at age 14 and had her first child at age 31.  Her menses are somewhat irregular and she is having some vasomotor symptoms.  She has a strong family history with her maternal grandmother who had breast cancer at age 77 and her paternal grandmother had breast cancer in her 60s.  She has a maternal great-grandmother who also had breast cancer at an unknown age.  The patient underwent a screening mammography at [facility] on 2023 showing some new suspicious calcifications in her right breast upper outer quadrant.  Diagnostic mammography in 2023 also showed these calcifications and she underwent a stereotactic core biopsy on May 3, 2023 which showed extensive high-grade DCIS with comedonecrosis associated with calcifications which was ER positive at 100% and UT positive at 5% ("ar" clip).  I reviewed her imaging on CD-ROM and indeed she has the suspicious right breast upper outer quadrant calcifications with another area of calcifications about 1 cm medial to the biopsied region.  I do not see any suspicious findings in the left breast.  On exam she just has postbiopsy changes in the right breast upper outer quadrant and no suspicious adenopathy.  I spoke to the patient with her entire family with her  as well as mother and father involved in the discussion.  She understands that this is a stage 0 cancer at this point but is high-grade and has a chance of having some associated invasive component.  I also spoke to her about the possible need for a lymph node evaluation with a sentinel lymph node biopsy and recommended it at the same time of the partial mastectomy due to a possible 20 to 30% risk of being upstaged to an invasive cancer and I recommended the sentinel node biopsy at the time of surgery.  Genetic panel testing performed at [place] was reportedly negative.   She underwent a bilateral breast MRI on May 24, 2023 showing the biopsy-proven DCIS in the right breast 12:00 region with significant clumped non-mass-like enhancement extending posteriorly 10.3 cm for which a separate MRI guided core biopsy was recommended.  She also had some findings in the right lobe of the liver.  Abdominal MRI showed this right hepatic lobe density to be consistent with a hemangioma.  Separate posterior right breast 12:00 MRI guided core biopsy was performed on 2023 at [place] which showed more extensive DCIS.  She was still felt to be a candidate for partial mastectomy and opted on breast conservation and was seen by Dr. Bates preoperatively who felt she is a good candidate for a reduction mastopexy versus tissue rearrangement.  She underwent a right breast bracketed partial mastectomy with tissue rearrangement closure and sentinel lymph node biopsy performed on 2023 with Dr. Bates.  The final pathology showed intermediate to high-grade DCIS and there were 3 sentinel lymph nodes with 1 having some rare isolated tumor cells.  This would be considered node-negative and still considered stage 0.  There was no invasive cancer but anterior margin was less than 1 mm.  I went over the pathology with the patient and advised her to undergo reexcision of the anterior margin which was performed on 2023.  On exam today, she is healing well from her right breast partial mastectomy and tissue rearrangement with sentinel lymph node biopsy and then reexcision of the anterior margin on a separate date.  She did have a recurrent seroma at the time of the surgery and I performed a tissue rearrangement closure to close off the seroma cavity at the time of the reexcision.  I attempted to aspirate the seroma again today but only got 5 cc.  Final pathology showed some usual duct hyperplasia and fat necrosis and I went over the pathology with the patient and gave her a copy of the report for her records.   She understands that since all her disease was intraductal, chemotherapy would not be recommended.  I would like her to see the medical oncologist however postoperatively just to talk about endocrine therapy since this DCIS was ER positive greater than 95% and UT positive at 40% on the final pathology.  I did place fiducial titanium clips at the base of the excision to aid the radiation oncologist and she understands the need for postoperative external beam radiation.  I would like to see her again in 3 months for routine follow-up and she was given Dr. Morrison's card but she would like to obtain her radiation therapy in [place] and will call [facility] to set up an appointment with the radiation oncologist there.

## 2023-08-07 ENCOUNTER — RESULT REVIEW (OUTPATIENT)
Age: 48
End: 2023-08-07

## 2023-08-07 ENCOUNTER — APPOINTMENT (OUTPATIENT)
Dept: HEMATOLOGY ONCOLOGY | Facility: CLINIC | Age: 48
End: 2023-08-07
Payer: COMMERCIAL

## 2023-08-07 VITALS
OXYGEN SATURATION: 98 % | DIASTOLIC BLOOD PRESSURE: 65 MMHG | HEIGHT: 61 IN | HEART RATE: 100 BPM | WEIGHT: 211.44 LBS | TEMPERATURE: 97.5 F | RESPIRATION RATE: 16 BRPM | BODY MASS INDEX: 39.92 KG/M2 | SYSTOLIC BLOOD PRESSURE: 108 MMHG

## 2023-08-07 PROCEDURE — 36415 COLL VENOUS BLD VENIPUNCTURE: CPT

## 2023-08-07 PROCEDURE — 99215 OFFICE O/P EST HI 40 MIN: CPT | Mod: 25

## 2023-08-07 NOTE — ASSESSMENT
[FreeTextEntry1] : # DCIS, ER+, [place]+ We have reviewed the diagnosis, prognosis and natural history of DCIS. We have reviewed the imaging and pathology reports personally and discussed the findings with the patient. We have discussed that she has already had a lumpectomy which is usually followed by radiation given the findings of DCIS.  She has an appt with Radiation Oncology to discuss the role of Radiation. We discussed the primary role of systemic treatment is to reduce the risk of invasive breast cancer and that endocrine therapy reduces recurrence rates but has not been shown to improve survival.  We discussed postoperative tamoxifen is more effective than placebo in reducing the risk of invasive breast cancer, with or without adjuvant radiotherapy, although there is no apparent benefit for survival NSABP B-24 showed the addition of tamoxifen to BCT for DCIS reduced the recurrence risk of ipsilateral DCIS and contralateral DCIS. There was a trend towards a lower risk of ipsilateral invasive carcinoma that did not reach statistical significance and a lower risk for contralateral invasive carcinoma. There was no benefit of tamoxifen in all-cause mortality. We reviewed the side effects of tamoxifen to include but are not limited to increased risk of uterine cancer, increased risk of VTE, hot flashes, decreased labido, vaginal dryness, mood swings  RTC in 4 months with cbc with diff, cmp, vit D.

## 2023-08-07 NOTE — CONSULT LETTER
[Dear  ___] : Dear  [unfilled], [Consult Letter:] : I had the pleasure of evaluating your patient, [unfilled]. [Please see my note below.] : Please see my note below. [Consult Closing:] : Thank you very much for allowing me to participate in the care of this patient.  If you have any questions, please do not hesitate to contact me. [Sincerely,] : Sincerely, [DrRodriguez  ___] : Dr. CRAFT [DrRodriguez ___] : Dr. CRAFT [FreeTextEntry3] : Mae Morrison DO, FACJOHANA, FACP Medical Oncology and Hematology [facility]

## 2023-08-07 NOTE — HISTORY OF PRESENT ILLNESS
[de-identified] : Ms. Coronado is a 47-year-old  perimenopausal female with a PMH of HTN, HLD that presents for initial consultation referred by Dr. Kelly for newly diagnosed DCIS.   Oncological History:   23 s/p screening mammogram revealing new suspicious calcifications in her right breast upper outer quadrant.   23 s/p diagnostic mammography revealing calcifications.   5/3/23 s/p stereotactic core biopsy revealing extensive high-grade DCIS with comedonecrosis associated with calcifications which was ER positive at 100% and NC positive at 5%.   23 s/p MRI Breast revealing biopsy-proven DCIS in the right breast 12:00 region with significant clumped non-mass-like enhancement extending posteriorly 10.3 cm for which a separate MRI guided core biopsy was recommended. She also had some findings in the right lobe of the liver. Abdominal MRI showed this right hepatic lobe density to be consistent with a hemangioma.   23 s/p R breast MRI guided biopsy pathology revealing more extensive DCIS.   She was still felt to be a candidate for partial mastectomy and opted on breast conservation and was seen by Dr. Bates preoperatively who felt she is a good candidate for a reduction mastopexy versus tissue rearrangement.   23 s/p R breast partial mastectomy with tissue rearrangement closure with Dr. Bates and sentinel lymph node biopsy performed pathology revealing intermediate to high-grade DCIS and there were 3 sentinel lymph nodes with 1 having some rare, isolated tumor cells. No IDC. Anterior margin less than 1mm.   23 s/p re-excision and final pathology was benign just showing some usual duct hyperplasia and fat necrosis.   Of note, patient states she had another opinion with Dr. Au at [facility] and also had a MRI guided L breast biopsy which was benign .   Breast Cancer Risk Factors: Menarche: 14 Date of LMP: currently menstruating  Menopause: n/a  Age at first live birth: 31 Nursed: yes Hysterectomy: no Oophorectomy: no OCP: a few years HRT: none Last pap/pelvic exam: never abnormal, yearly with GYN Dr. Palacios Related family history: MGM breast cancer at age 77, PGM breast ca at age 60, maternal great grandmother breast cancer  Ashkenazi: no  BRCA testing: completed 2023 at [place] pending results

## 2023-08-07 NOTE — PHYSICAL EXAM
[Fully active, able to carry on all pre-disease performance without restriction] : Status 0 - Fully active, able to carry on all pre-disease performance without restriction [Normal] : affect appropriate [de-identified] : R breast s/p lumpectomy with SLNx - scars healing, no masses or LAD bilaterally

## 2023-08-14 ENCOUNTER — APPOINTMENT (OUTPATIENT)
Dept: RADIATION ONCOLOGY | Facility: CLINIC | Age: 48
End: 2023-08-14
Payer: COMMERCIAL

## 2023-08-14 VITALS
HEART RATE: 109 BPM | OXYGEN SATURATION: 95 % | DIASTOLIC BLOOD PRESSURE: 74 MMHG | SYSTOLIC BLOOD PRESSURE: 126 MMHG | RESPIRATION RATE: 18 BRPM

## 2023-08-14 PROCEDURE — 99205 OFFICE O/P NEW HI 60 MIN: CPT | Mod: 25

## 2023-08-14 RX ORDER — OXYCODONE AND ACETAMINOPHEN 5; 325 MG/1; MG/1
5-325 TABLET ORAL
Qty: 20 | Refills: 0 | Status: COMPLETED | COMMUNITY
Start: 2023-06-16 | End: 2023-08-14

## 2023-08-14 RX ORDER — CLOBETASOL PROPIONATE 0.5 MG/G
0.05 OINTMENT TOPICAL TWICE DAILY
Qty: 2 | Refills: 0 | Status: COMPLETED | COMMUNITY
Start: 2023-06-28 | End: 2023-08-14

## 2023-08-14 NOTE — VITALS
[Maximal Pain Intensity: 0/10] : 0/10 [Least Pain Intensity: 0/10] : 0/10 [NoTreatment Scheduled] : no treatment scheduled [100: Normal, no complaints, no evidence of disease.] : 100: Normal, no complaints, no evidence of disease. [Date: ____________] : Patient's last distress assessment performed on [unfilled]. [0 - No Distress] : Distress Level: 0

## 2023-08-22 NOTE — REVIEW OF SYSTEMS
[Negative] : Integumentary [Chest Pain] : no chest pain [Shortness Of Breath] : no shortness of breath [Cough] : no cough

## 2023-08-22 NOTE — LETTER CLOSING
[Consult Closing:] : Thank you for allowing me to participate in the care of this patient.  If you have any questions, please do not hesitate to contact me. [Sincerely yours,] : Sincerely yours, [FreeTextEntry3] : Za Spears MD

## 2023-08-22 NOTE — PHYSICAL EXAM
[Normal] : normal skin color and pigmentation and no rash [de-identified] : s/p right partial mastectomy

## 2023-08-22 NOTE — HISTORY OF PRESENT ILLNESS
[FreeTextEntry1] : Ms. Coronado is a 47-year-old female presenting for consultation for newly diagnosed ER/MO + DCIS of the right breast.   Ms. Coronado underwent a screening mammo on 4/13/23: which revealed questionable calcifications in the right breast.  Diagnostic mammo performed on 4/26/23 revealed 2 adjacent clusters of indeterminate calcifications extending over 1.5 cm in the upper right breast.  On 5/3/23 a right stereotactic biopsy was performed that revealed extensive ductal carcinoma in situ, high-grade, with comedonecrosis, calcifications and cancerization of lobules. Incidental minute intraductal papilloma. Cysts with apocrine metaplasis. ER/MO +.  5/19/23 a stereotactic biopsy left breast was also performed that revealed:  fibrocystic changes with occasional calcifications in the upper outer breast at 1:30 7 cm from the nipple.   5/23/23 MRI Breast:  1. Biopsy-proven ductal carcinoma in situ at the right 12:00 axis, anterior depth. Extensive suspicious clumped non-mass enhancement was seen at the site of biopsy extending posterior from the site of biopsy, measuring 10.3 cm in AP dimension. Of note, suspicious enhancement in this location was markedly larger in size than calcifications at the right 12:00 axis on mammography.  2. Susceptibility artifact from a biopsy marker is seen in the central outer left breast, at the site of benign biopsy. Mild enhancement at the site of biopsy likely represents postprocedural change.  3. T2 hyperintense lesion in the right lobe of liver. Although this finding likely represents a cyst versus hemangioma, contrast-enhanced breast MRI was recommended to exclude metastatic disease in the setting of known breast cancer.  On 6/6/23 an additional MRI-guided biopsy right breast was performed at the 12:00 axis and revealed ductal carcinoma in situ with prominent clear cell change. Solid and papillary architecture. Intermediate nuclear grade. Negative for necrosis. Involved multiple cores. With extension into lobules. With pagetoid extension along ducts. ER/MO +.   On 6/23/23 Ms. Coronado underwent a right partial mastectomy with sentinel lymph node biopsy performed by Dr. Kelly. Pathology revealed DCIS ~75 mm. Cribriform and solid patterning with central necrosis and microcalcifications. Nuclear grade II-III. The closest margin was the anterior margin at less than 1 mm. 1/3 lymph nodes were positive for isolated tumor cells. ER/MO+.  7/17/23 Ms. Coronado underwent an additional right anterior margin excision. Pathology revealed benign breast parenchyma with surgical site changes, duct ectasia, chronic inflammation, fat necrosis, and usual ductal hyperplasia.    Ms. Coronado presents today for consultation. She is followed by Dr. Morrison who has ordered Tamoxifen for her to begin after radiation therapy has completed. She does verbalize some anxiety and stress r/t to the diagnosis and her four children. Emotional support provided. She denies any pain at this time. She verbalizes no personal history of cancer, and she does have a family history of breast cancer in both her maternal and paternal grandmothers.

## 2023-08-22 NOTE — DISEASE MANAGEMENT
[Pathological] : TNM Stage: p [FreeTextEntry4] : ER/MT + DCIS with ITC 1/3 Lymph nodes [TTNM] : IS [NTNM] : 0 [MTNM] : X [0] : 0

## 2023-09-10 ENCOUNTER — NON-APPOINTMENT (OUTPATIENT)
Age: 48
End: 2023-09-10

## 2023-09-19 ENCOUNTER — NON-APPOINTMENT (OUTPATIENT)
Age: 48
End: 2023-09-19

## 2023-09-19 VITALS
DIASTOLIC BLOOD PRESSURE: 82 MMHG | HEART RATE: 90 BPM | SYSTOLIC BLOOD PRESSURE: 132 MMHG | OXYGEN SATURATION: 96 % | RESPIRATION RATE: 16 BRPM

## 2023-09-26 ENCOUNTER — NON-APPOINTMENT (OUTPATIENT)
Age: 48
End: 2023-09-26

## 2023-09-26 VITALS
RESPIRATION RATE: 18 BRPM | SYSTOLIC BLOOD PRESSURE: 117 MMHG | OXYGEN SATURATION: 99 % | DIASTOLIC BLOOD PRESSURE: 71 MMHG | HEART RATE: 98 BPM

## 2023-10-03 ENCOUNTER — NON-APPOINTMENT (OUTPATIENT)
Age: 48
End: 2023-10-03

## 2023-10-04 VITALS
RESPIRATION RATE: 18 BRPM | OXYGEN SATURATION: 97 % | SYSTOLIC BLOOD PRESSURE: 113 MMHG | DIASTOLIC BLOOD PRESSURE: 78 MMHG | HEART RATE: 95 BPM

## 2023-10-10 ENCOUNTER — NON-APPOINTMENT (OUTPATIENT)
Age: 48
End: 2023-10-10

## 2023-10-10 VITALS
OXYGEN SATURATION: 97 % | HEART RATE: 99 BPM | DIASTOLIC BLOOD PRESSURE: 86 MMHG | RESPIRATION RATE: 17 BRPM | SYSTOLIC BLOOD PRESSURE: 139 MMHG

## 2023-10-17 ENCOUNTER — NON-APPOINTMENT (OUTPATIENT)
Age: 48
End: 2023-10-17

## 2023-10-17 VITALS
SYSTOLIC BLOOD PRESSURE: 144 MMHG | DIASTOLIC BLOOD PRESSURE: 100 MMHG | OXYGEN SATURATION: 97 % | HEART RATE: 93 BPM | RESPIRATION RATE: 18 BRPM

## 2023-10-29 ENCOUNTER — NON-APPOINTMENT (OUTPATIENT)
Age: 48
End: 2023-10-29

## 2023-10-31 DIAGNOSIS — N63.20 UNSPECIFIED LUMP IN THE LEFT BREAST, UNSPECIFIED QUADRANT: ICD-10-CM

## 2023-11-07 ENCOUNTER — APPOINTMENT (OUTPATIENT)
Dept: BREAST CENTER | Facility: CLINIC | Age: 48
End: 2023-11-07
Payer: COMMERCIAL

## 2023-11-07 VITALS
SYSTOLIC BLOOD PRESSURE: 103 MMHG | DIASTOLIC BLOOD PRESSURE: 79 MMHG | BODY MASS INDEX: 39.65 KG/M2 | HEART RATE: 68 BPM | HEIGHT: 61 IN | OXYGEN SATURATION: 97 % | TEMPERATURE: 98.1 F | WEIGHT: 210 LBS

## 2023-11-07 PROCEDURE — 99213 OFFICE O/P EST LOW 20 MIN: CPT

## 2023-11-30 ENCOUNTER — APPOINTMENT (OUTPATIENT)
Dept: RADIATION ONCOLOGY | Facility: CLINIC | Age: 48
End: 2023-11-30
Payer: COMMERCIAL

## 2023-11-30 VITALS
DIASTOLIC BLOOD PRESSURE: 67 MMHG | RESPIRATION RATE: 17 BRPM | OXYGEN SATURATION: 97 % | HEIGHT: 61 IN | SYSTOLIC BLOOD PRESSURE: 123 MMHG | HEART RATE: 85 BPM

## 2023-11-30 VITALS — BODY MASS INDEX: 39.68 KG/M2 | WEIGHT: 210 LBS

## 2023-11-30 PROCEDURE — 99024 POSTOP FOLLOW-UP VISIT: CPT

## 2023-12-13 NOTE — CONSULT LETTER
[Dear  ___] : Dear  [unfilled], [Consult Letter:] : I had the pleasure of evaluating your patient, [unfilled]. [Please see my note below.] : Please see my note below. [Consult Closing:] : Thank you very much for allowing me to participate in the care of this patient.  If you have any questions, please do not hesitate to contact me. [Sincerely,] : Sincerely, [FreeTextEntry3] : Mae Morrison DO, FACJOHANA, FACP Medical Oncology and Hematology [facility] [DrRodriguez  ___] : Dr. CRAFT [DrRodriguez ___] : Dr. CRAFT

## 2023-12-20 ENCOUNTER — RESULT REVIEW (OUTPATIENT)
Age: 48
End: 2023-12-20

## 2023-12-20 ENCOUNTER — APPOINTMENT (OUTPATIENT)
Dept: HEMATOLOGY ONCOLOGY | Facility: CLINIC | Age: 48
End: 2023-12-20
Payer: COMMERCIAL

## 2023-12-20 VITALS
HEART RATE: 95 BPM | BODY MASS INDEX: 39.73 KG/M2 | DIASTOLIC BLOOD PRESSURE: 70 MMHG | HEIGHT: 61 IN | RESPIRATION RATE: 16 BRPM | WEIGHT: 210.44 LBS | OXYGEN SATURATION: 99 % | TEMPERATURE: 97 F | SYSTOLIC BLOOD PRESSURE: 104 MMHG

## 2023-12-20 DIAGNOSIS — R25.2 CRAMP AND SPASM: ICD-10-CM

## 2023-12-20 PROCEDURE — 36415 COLL VENOUS BLD VENIPUNCTURE: CPT

## 2023-12-20 PROCEDURE — 99214 OFFICE O/P EST MOD 30 MIN: CPT | Mod: 25

## 2023-12-22 PROBLEM — R25.2 LEG CRAMPS: Status: ACTIVE | Noted: 2023-12-22

## 2023-12-22 NOTE — REVIEW OF SYSTEMS
[Diarrhea: Grade 0] : Diarrhea: Grade 0 [Negative] : Allergic/Immunologic [Constipation] : constipation [Muscle Pain] : muscle pain [Hot Flashes] : hot flashes

## 2023-12-22 NOTE — ASSESSMENT
[FreeTextEntry1] : #DCIS - ER+, [place]+ - We have reviewed the diagnosis, prognosis and natural history of DCIS. - We have reviewed the imaging and pathology reports personally and discussed the findings with the patient. - We have discussed that she has already had a lumpectomy which is usually followed by radiation given the findings of DCIS. - She has an appt with Radiation Oncology to discuss the role of Radiation. - We discussed the primary role of systemic treatment is to reduce the risk of invasive breast cancer and that endocrine therapy reduces recurrence rates but has not been shown to improve survival. - We discussed postoperative tamoxifen is more effective than placebo in reducing the risk of invasive breast cancer, with or without adjuvant radiotherapy, although there is no apparent benefit for survival NSABP B-24 showed the addition of tamoxifen to BCT for DCIS reduced the recurrence risk of ipsilateral DCIS and contralateral DCIS. There was a trend towards a lower risk of ipsilateral invasive carcinoma that did not reach statistical significance and a lower risk for contralateral invasive carcinoma. There was no benefit of tamoxifen in all-cause mortality. - We reviewed the side effects of tamoxifen to include but are not limited to increased risk of uterine cancer, increased risk of VTE, hot flashes, decreased labido, vaginal dryness, mood swings - s/p completion RT  - 12/20/23 vs and CBC reviewed; WBC 5.73, hgb 12.6, plt 323. s/p completion of RT. Started on tamoxifen 20mg daily with severe leg cramping and lowered to 20mg every other day. She still has leg cramping but somehwat improved. Continues to have hotflashes, asking to be switched to lower dose as her s/s are still bothersome. Advised standard dosing is 20mg dialy however reviewed guidelines revealing when using tamoxifen for DCIS, we administer it at the standard dose of 20 mg orally daily, which was the dose evaluated in the clinical trials. However, for women with DCIS who would otherwise discontinue endocrine therapy due to substantial toxicities, we offer lower-dose tamoxifen (10 mg every other day). She will try this. s/p follow up with Dr. Spears and Dr. Kelly 11/2023 notes reviewed. s/p mammo 11/2023 at [facility]. Obtain records   #Leg Cramping and Hotflashes  - As above, lower dose to 10mg every other day   #Vit D Deficiency  - Check levels today  #Health Maintenance  - GYN Dr. Jang will be undergoing TVUS due to being on Tamoxifen as baseline  - PCP Dr. Mejia  - Mammo 11/2023 [facility]   RTC in 4 months with cbc with diff, cmp, vit D  Case and management discussed with Dr. Morrison

## 2023-12-22 NOTE — PHYSICAL EXAM
[Fully active, able to carry on all pre-disease performance without restriction] : Status 0 - Fully active, able to carry on all pre-disease performance without restriction [Normal] : affect appropriate [de-identified] : R breast s/p lumpectomy with SLNx - scars healed  no masses or LAD bilaterally, some RT tanning

## 2023-12-22 NOTE — HISTORY OF PRESENT ILLNESS
[de-identified] : Ms. Coronado is a 47-year-old  perimenopausal female with a PMH of HTN, HLD that presents for initial consultation referred by Dr. Kelly for newly diagnosed DCIS.   Oncological History:   23 s/p screening mammogram revealing new suspicious calcifications in her right breast upper outer quadrant.   23 s/p diagnostic mammography revealing calcifications.   5/3/23 s/p stereotactic core biopsy revealing extensive high-grade DCIS with comedonecrosis associated with calcifications which was ER positive at 100% and SC positive at 5%.   23 s/p MRI Breast revealing biopsy-proven DCIS in the right breast 12:00 region with significant clumped non-mass-like enhancement extending posteriorly 10.3 cm for which a separate MRI guided core biopsy was recommended. She also had some findings in the right lobe of the liver. Abdominal MRI showed this right hepatic lobe density to be consistent with a hemangioma.   23 s/p R breast MRI guided biopsy pathology revealing more extensive DCIS.   She was still felt to be a candidate for partial mastectomy and opted on breast conservation and was seen by Dr. Bates preoperatively who felt she is a good candidate for a reduction mastopexy versus tissue rearrangement.   23 s/p R breast partial mastectomy with tissue rearrangement closure with Dr. Bates and sentinel lymph node biopsy performed pathology revealing intermediate to high-grade DCIS and there were 3 sentinel lymph nodes with 1 having some rare, isolated tumor cells. No IDC. Anterior margin less than 1mm.   23 s/p re-excision and final pathology was benign just showing some usual duct hyperplasia and fat necrosis.   Of note, patient states she had another opinion with Dr. Au at [facility] and also had a MRI guided L breast biopsy which was benign .   Breast Cancer Risk Factors: Menarche: 14 Date of LMP: currently menstruating  Menopause: n/a  Age at first live birth: 31 Nursed: yes Hysterectomy: no Oophorectomy: no OCP: a few years HRT: none Last pap/pelvic exam: never abnormal, yearly with GYN Dr. Palacios Related family history: MGM breast cancer at age 77, PGM breast ca at age 62, maternal great grandmother breast cancer  Ashkenazi: no  BRCA testing: completed 2023 at [place] pending results     [de-identified] : Patient seen and examined for routine follow up for the management of DCIS. She has started on Tamoxifen but due of severe leg cramping she was taking tamoxifen 20mg every other day with some improvement; however she still notes hot flashes, feeling tired and a lot of GI side effects. +constipation. She is s/p follow up with Dr. Spears and Dr. Kelly 11/2023. She is s/p mammo at [facility] 11/2023. She has seen her GYN Dr. Jang who will be preforming TVUS as well due to being on Tamoxifen.

## 2024-03-27 NOTE — VITALS
[Least Pain Intensity: 0/10] : 0/10 [Maximal Pain Intensity: 0/10] : 0/10 [NoTreatment Scheduled] : no treatment scheduled [100: Normal, no complaints, no evidence of disease.] : 100: Normal, no complaints, no evidence of disease.

## 2024-03-28 ENCOUNTER — APPOINTMENT (OUTPATIENT)
Dept: RADIATION ONCOLOGY | Facility: CLINIC | Age: 49
End: 2024-03-28
Payer: COMMERCIAL

## 2024-03-28 VITALS
SYSTOLIC BLOOD PRESSURE: 134 MMHG | RESPIRATION RATE: 18 BRPM | OXYGEN SATURATION: 98 % | HEART RATE: 114 BPM | DIASTOLIC BLOOD PRESSURE: 87 MMHG

## 2024-03-28 PROCEDURE — 99214 OFFICE O/P EST MOD 30 MIN: CPT

## 2024-04-01 NOTE — DISEASE MANAGEMENT
[Pathological] : TNM Stage: p [0] : 0 [FreeTextEntry4] : ER/AR + DCIS with ITC 1/3 Lymph nodes [TTNM] : IS [MTNM] : X [NTNM] : 0

## 2024-04-01 NOTE — PHYSICAL EXAM
[Normal] : normal skin color and pigmentation and no rash [de-identified] : s/p right partial mastectomy, mild persistent hyperpigmentation.

## 2024-04-01 NOTE — HISTORY OF PRESENT ILLNESS
[FreeTextEntry1] : Ms. Coronado is a 47-year-old female presenting for consultation for newly diagnosed ER/UT + DCIS of the right breast s/p lumpectomy and adjuvant radiation therapy.   Ms. Coronado underwent a screening mammogram on 4/13/23: which revealed questionable calcifications in the right breast.  Diagnostic mammo performed on 4/26/23 revealed 2 adjacent clusters of indeterminate calcifications extending over 1.5 cm in the upper right breast.  On 5/3/23 a right stereotactic biopsy was performed that revealed extensive ductal carcinoma in situ, high-grade, with comedonecrosis, calcifications and cancerization of lobules. Incidental minute intraductal papilloma. Cysts with apocrine metaplasis. ER/UT +. On 5/19/23, a stereotactic biopsy left breast was also performed that revealed:  fibrocystic changes with occasional calcifications in the upper outer breast at 1:30 7 cm from the nipple.   5/23/23 MRI Breast:  1. Biopsy-proven ductal carcinoma in situ at the right 12:00 axis, anterior depth. Extensive suspicious clumped non-mass enhancement was seen at the site of biopsy extending posterior from the site of biopsy, measuring 10.3 cm in AP dimension. Of note, suspicious enhancement in this location was markedly larger in size than calcifications at the right 12:00 axis on mammography.  2. Susceptibility artifact from a biopsy marker is seen in the central outer left breast, at the site of benign biopsy. Mild enhancement at the site of biopsy likely represents postprocedural change.  3. T2 hyperintense lesion in the right lobe of liver. Although this finding likely represents a cyst versus hemangioma, contrast-enhanced breast MRI was recommended to exclude metastatic disease in the setting of known breast cancer.  On 6/6/23 an additional MRI-guided biopsy right breast was performed at the 12:00 axis and revealed ductal carcinoma in situ with prominent clear cell change. Solid and papillary architecture. Intermediate nuclear grade. Negative for necrosis. Involved multiple cores. With extension into lobules. With pagetoid extension along ducts. ER/UT +.   On 6/23/23 Ms. Coronado underwent a right partial mastectomy with sentinel lymph node biopsy performed by Dr. Kelly. Pathology revealed DCIS ~75 mm. Cribriform and solid patterning with central necrosis and microcalcifications. Nuclear grade II-III. The closest margin was the anterior margin at less than 1 mm. 1/3 lymph nodes were positive for isolated tumor cells. ER/UT+.  7/17/23 Ms. Coronado underwent an additional right anterior margin excision. Pathology revealed benign breast parenchyma with surgical site changes, duct ectasia, chronic inflammation, fat necrosis, and usual ductal hyperplasia.   Ms. Coronado was treated with adjuvant radiation therapy to the right breast and axilla to a total of 5240 cGy (including boost) completed on 10/17/2023.   Ms. Coronado presents for her routine follow-up status post completion of radiation. She reports that she is doing well. She denies any new radiation related side effects or concerns. She continues to take Tamoxifen every other day secondary to side effects she was experiencing. She is scheduled for follow-up by Dr. Morrison and Dr. Kelly in April and May, respectively.

## 2024-04-08 ENCOUNTER — RESULT REVIEW (OUTPATIENT)
Age: 49
End: 2024-04-08

## 2024-04-08 ENCOUNTER — APPOINTMENT (OUTPATIENT)
Dept: HEMATOLOGY ONCOLOGY | Facility: CLINIC | Age: 49
End: 2024-04-08
Payer: COMMERCIAL

## 2024-04-08 VITALS
OXYGEN SATURATION: 98 % | WEIGHT: 211.31 LBS | BODY MASS INDEX: 39.9 KG/M2 | SYSTOLIC BLOOD PRESSURE: 97 MMHG | RESPIRATION RATE: 16 BRPM | TEMPERATURE: 96.7 F | DIASTOLIC BLOOD PRESSURE: 66 MMHG | HEART RATE: 100 BPM | HEIGHT: 61 IN

## 2024-04-08 DIAGNOSIS — M25.551 PAIN IN RIGHT HIP: ICD-10-CM

## 2024-04-08 DIAGNOSIS — M25.552 PAIN IN RIGHT HIP: ICD-10-CM

## 2024-04-08 DIAGNOSIS — E55.9 VITAMIN D DEFICIENCY, UNSPECIFIED: ICD-10-CM

## 2024-04-08 DIAGNOSIS — D05.11 INTRADUCTAL CARCINOMA IN SITU OF RIGHT BREAST: ICD-10-CM

## 2024-04-08 PROCEDURE — 99215 OFFICE O/P EST HI 40 MIN: CPT

## 2024-04-08 PROCEDURE — 36415 COLL VENOUS BLD VENIPUNCTURE: CPT

## 2024-04-08 RX ORDER — TAMOXIFEN CITRATE 10 MG/1
10 TABLET, FILM COATED ORAL
Qty: 90 | Refills: 3 | Status: ACTIVE | COMMUNITY
Start: 2023-12-20 | End: 1900-01-01

## 2024-04-08 RX ORDER — TAMOXIFEN CITRATE 20 MG/1
20 TABLET, FILM COATED ORAL DAILY
Qty: 90 | Refills: 3 | Status: COMPLETED | COMMUNITY
Start: 2023-08-07 | End: 2024-04-08

## 2024-04-08 NOTE — CONSULT LETTER
[Dear  ___] : Dear  [unfilled], [Consult Letter:] : I had the pleasure of evaluating your patient, [unfilled]. [Please see my note below.] : Please see my note below. [Consult Closing:] : Thank you very much for allowing me to participate in the care of this patient.  If you have any questions, please do not hesitate to contact me. [Sincerely,] : Sincerely, [FreeTextEntry3] : Mae Morrison DO, FACJOHANA, FACP Medical Oncology and Hematology [facility]

## 2024-04-08 NOTE — REVIEW OF SYSTEMS
[Constipation] : constipation [Diarrhea: Grade 0] : Diarrhea: Grade 0 [Muscle Pain] : muscle pain [Hot Flashes] : hot flashes [Skin Rash] : skin rash [Negative] : Musculoskeletal [de-identified] : noticed some blemishing over the last week on upper left extremity

## 2024-04-08 NOTE — PHYSICAL EXAM
[Fully active, able to carry on all pre-disease performance without restriction] : Status 0 - Fully active, able to carry on all pre-disease performance without restriction [Normal] : affect appropriate [de-identified] : refusing breast exam

## 2024-04-08 NOTE — ASSESSMENT
[FreeTextEntry1] : #DCIS - ER+, WA+ - s/p completion RT  - 12/20/23 vs and CBC reviewed; WBC 5.73, hgb 12.6, plt 323. s/p completion of RT. Started on tamoxifen 20mg daily with severe leg cramping and lowered to 20mg every other day. She still has leg cramping but somehwat improved. Continues to have hotflashes, asking to be switched to lower dose as her s/s are still bothersome. Advised standard dosing is 20mg dialy however reviewed guidelines revealing when using tamoxifen for DCIS, we administer it at the standard dose of 20 mg orally daily, which was the dose evaluated in the clinical trials. However, for women with DCIS who would otherwise discontinue endocrine therapy due to substantial toxicities, we offer lower-dose tamoxifen (10 mg every other day). She will try this. s/p follow up with Dr. Spears and Dr. Kelly 11/2023 notes reviewed. s/p mammo 11/2023 at [facility]. Obtain records  -4/8/24 - vs and labs reviewed. labs drawn in office today. wbc 5.64, hgb 13.2, plt 319 continue tamoxifen every other day. she is requesting tumor markers. ordered. follow up with Dr. Kelly 5/24. bilateral mammography and ultrasound in April 2024. Reviewed note by Dr. Spears 3/24  #Hip pain bilaterally will check xray hips bilaterally  #Leg Cramping and Hotflashes  - As above, lower dose to 10mg every other day   #Vit D Deficiency  - Check levels today - was on 39128 IU daily vit D 2000 IU daily  #Health Maintenance  - GYN Dr. Jang will be undergoing TVUS due to being on Tamoxifen as baseline  - PCP Dr. Mejia  - Mammo 11/2023 [facility]  - CNY - 3/11/24 - normal recommend 5 years - 2029  RTC in 4 months with cbc with diff, cmp, vit D, b12, zinc, iron, ferritin, ca 15.3, cea (per patient's request for tumor markers)

## 2024-04-08 NOTE — HISTORY OF PRESENT ILLNESS
[de-identified] : Ms. Coronado is a 47-year-old  perimenopausal female with a PMH of HTN, HLD that presents for initial consultation referred by Dr. Kelly for newly diagnosed DCIS.   Oncological History:   23 s/p screening mammogram revealing new suspicious calcifications in her right breast upper outer quadrant.   23 s/p diagnostic mammography revealing calcifications.   5/3/23 s/p stereotactic core biopsy revealing extensive high-grade DCIS with comedonecrosis associated with calcifications which was ER positive at 100% and CA positive at 5%.   23 s/p MRI Breast revealing biopsy-proven DCIS in the right breast 12:00 region with significant clumped non-mass-like enhancement extending posteriorly 10.3 cm for which a separate MRI guided core biopsy was recommended. She also had some findings in the right lobe of the liver. Abdominal MRI showed this right hepatic lobe density to be consistent with a hemangioma.   23 s/p R breast MRI guided biopsy pathology revealing more extensive DCIS.   She was still felt to be a candidate for partial mastectomy and opted on breast conservation and was seen by Dr. Bates preoperatively who felt she is a good candidate for a reduction mastopexy versus tissue rearrangement.   23 s/p R breast partial mastectomy with tissue rearrangement closure with Dr. Bates and sentinel lymph node biopsy performed pathology revealing intermediate to high-grade DCIS and there were 3 sentinel lymph nodes with 1 having some rare, isolated tumor cells. No IDC. Anterior margin less than 1mm.   23 s/p re-excision and final pathology was benign just showing some usual duct hyperplasia and fat necrosis.   Of note, patient states she had another opinion with Dr. Au at [facility] and also had a MRI guided L breast biopsy which was benign .   Breast Cancer Risk Factors: Menarche: 14 Date of LMP: currently menstruating  Menopause: n/a  Age at first live birth: 31 Nursed: yes Hysterectomy: no Oophorectomy: no OCP: a few years HRT: none Last pap/pelvic exam: never abnormal, yearly with GYN Dr. Palacios Related family history: MGM breast cancer at age 77, PGM breast ca at age 60, maternal great grandmother breast cancer  Ashkenazi: no  BRCA testing: completed 2023 at [place] pending results     [de-identified] : Patient seen and examined for routine follow up for the management of DCIS.  lower-dose tamoxifen (10 mg every other day) tolerating well.  Hot flashes hip pain - wants xray She is s/p follow up with Dr. Spears reviewed note from 3/24 and Dr. Kelly 5/24 She is s/p mammo at [facility] 11/2023.  She has seen her GYN Dr. Jang who will be preforming TVUS as well due to being on Tamoxifen.

## 2024-04-12 ENCOUNTER — NON-APPOINTMENT (OUTPATIENT)
Age: 49
End: 2024-04-12

## 2024-04-26 NOTE — REASON FOR VISIT
[Follow-Up: _____] : a [unfilled] follow-up visit [FreeTextEntry1] : The patient is a perimenopausal white female of [place] and Upper sor[place] descent with a strong family history of breast cancer who was found to have suspicious calcifications in her right breast upper outer quadrant on screening mammography and stereotactic biopsy in May 2023 showed high-grade DCIS with comedonecrosis which was ER/TN positive.  MRI performed in May 2023 showed the biopsy-proven 12:00 DCIS but significant non-mass-like enhancement extending 10 cm posteriorly and separate more posterior MRI guided core biopsy performed in June 2023 showed more extensive DCIS again in this 12:00 but posterior region.  A right breast bracketed partial mastectomy with sentinel lymph node biopsy and tissue rearrangement closure by Dr. Bates was performed on June 23, 2023.  Final pathology showed intermediate to high-grade DCIS with 3 sentinel lymph nodes with 1 having some rare isolated tumor cells but there was no invasive cancer and margins were free but she did have an anterior margin less than 1 mm.  She underwent reexcision of the anterior margin on July 17, 2023 which was benign just showing some usual duct hyperplasia and scarring changes.  She was seen by Dr. Morrison and endocrine therapy was recommended and she underwent external beam radiation through Dr. Spears.  She comes in now for routine follow-up.

## 2024-04-26 NOTE — HISTORY OF PRESENT ILLNESS
[FreeTextEntry1] : The patient is a 48-year-old  perimenopausal white female of Brazilian and [place] descent.  She underwent menarche at age 14 and had her first child at age 31.  Her menses are somewhat irregular and she is having some vasomotor symptoms.  She has a strong family history with her maternal grandmother who had breast cancer at age 77 and her paternal grandmother had breast cancer in her 60s.  She has a maternal great-grandmother who also had breast cancer at an unknown age.  The patient underwent a screening mammography at [facility] on 2023 showing some new suspicious calcifications in her right breast upper outer quadrant.  Diagnostic mammography in 2023 also showed these calcifications and she underwent a stereotactic core biopsy on May 3, 2023 which showed extensive high-grade DCIS with comedonecrosis associated with calcifications which was ER positive at 100% and DE positive at 5% ("ar" clip).  She sought other surgical opinions.  She underwent a bilateral breast MRI on May 24, 2023 showing the biopsy-proven DCIS in the right breast 12:00 region with significant clumped non-mass-like enhancement extending posteriorly 10.3 cm for which a separate MRI guided core biopsy was recommended.  She also had some findings in the right lobe of the liver.  Abdominal MRI showed this right hepatic lobe density to be consistent with a hemangioma.  Separate posterior right breast 12:00 MRI guided core biopsy was performed on 2023 at [place] which showed more extensive DCIS.  She was still felt to be a candidate for partial mastectomy and opted on breast conservation and was seen by Dr. Bates preoperatively who felt she is a good candidate for a reduction mastopexy versus tissue rearrangement.  She underwent a right breast bracketed partial mastectomy with tissue rearrangement closure and sentinel lymph node biopsy performed on 2023 with Dr. Bates.  The final pathology showed intermediate to high-grade DCIS and there were 3 sentinel lymph nodes with 1 having some rare isolated tumor cells.  This would be considered node-negative and still considered stage 0.  There was no invasive cancer but anterior margin was less than 1 mm.  I took her back to the operating room for reexcision of the anterior margin on 2023 and the final pathology was benign just showing some usual duct hyperplasia and fat necrosis.  She was seen by Dr. Morrison and endocrine therapy with tamoxifen was initially started at 20 mg a day but she developed severe leg cramping and is currently taking 10 mg every other day.  She was seen by Dr. Spears and underwent external beam radiation which was completed on 2023.  She comes in now for routine follow-up.

## 2024-04-26 NOTE — ASSESSMENT
[FreeTextEntry1] : The patient is a 48-year-old  perimenopausal white female of [place] and [place] descent. She underwent menarche at age 14 and had her first child at age 31. Her menses are somewhat irregular and she is having some vasomotor symptoms. She has a strong family history with her maternal grandmother who had breast cancer at age 77 and her paternal grandmother had breast cancer in her 60s. She has a maternal great-grandmother who also had breast cancer at an unknown age. The patient underwent a screening mammography at [facility] on 2023 showing some new suspicious calcifications in her right breast upper outer quadrant. Diagnostic mammography on 2023 also showed these calcifications and she underwent a stereotactic core biopsy on May 3, 2023 which showed extensive high-grade DCIS with comedonecrosis associated with calcifications which was ER positive at 100% and RI positive at 5% ("ar" clip). I reviewed her imaging on CD-ROM and indeed she has the suspicious right breast upper outer quadrant calcifications with another area of calcifications about 1 cm medial to the biopsied region. I do not see any suspicious findings in the left breast. On exam she just has postbiopsy changes in the right breast upper outer quadrant and no suspicious adenopathy. I spoke to the patient with her entire family with her  as well as mother and father involved in the discussion. She understands that this is a stage 0 cancer at this point but is high-grade and has a chance of having some associated invasive component. I also spoke to her about the possible need for a lymph node evaluation with a sentinel lymph node biopsy and recommended it at the same time of the partial mastectomy due to a possible 20 to 30% risk of being upstaged to an invasive cancer and I recommended the sentinel node biopsy at the time of surgery. Genetic panel testing performed at [place] was reportedly negative. She underwent a bilateral breast MRI on May 24, 2023 showing the biopsy-proven DCIS in the right breast 12:00 region with significant clumped non-mass-like enhancement extending posteriorly 10.3 cm for which a separate MRI guided core biopsy was recommended. She also had some findings in the right lobe of the liver. Abdominal MRI showed this right hepatic lobe density to be consistent with a hemangioma. Separate posterior right breast 12:00 MRI guided core biopsy was performed on 2023 at [place] which showed more extensive DCIS. She was still felt to be a candidate for partial mastectomy and opted on breast conservation and was seen by Dr. Bates preoperatively who felt she is a good candidate for a reduction mastopexy versus tissue rearrangement. She underwent a right breast bracketed partial mastectomy with tissue rearrangement closure and sentinel lymph node biopsy performed on 2023 with Dr. Bates. The final pathology showed intermediate to high-grade DCIS and there were 3 sentinel lymph nodes with 1 having some rare isolated tumor cells. This would be considered node-negative and still considered stage 0. There was no invasive cancer but anterior margin was less than 1 mm. I went over the pathology with the patient and advised her to undergo reexcision of the anterior margin which was performed on 2023. She did have a recurrent seroma at the time of the surgery and I performed a tissue rearrangement closure to close off the seroma cavity at the time of the reexcision. Final pathology showed some usual duct hyperplasia and fat necrosis.  She was seen by Dr. Morrison and endocrine therapy with tamoxifen was initially started at 20 mg a day but she developed severe leg cramping and is currently on 10 mg every other day.  I did place fiducial titanium clips at the base of the excision to aid the radiation oncologist and she was seen by Dr. Spears and underwent external beam radiation which finished on 2023.  She underwent her last bilateral mammography and ultrasound which was reviewed from ??????.  On exam today, she has healed well from her right breast partial mastectomy and tissue rearrangement with sentinel lymph node biopsy and then reexcision of the anterior margin on a separate date.  I would like to see her again in 6 months for routine follow-up.  She will be due for her next bilateral mammography and ultrasound in????????? 2025 and she was given prescriptions.  ???????? She also has a diagnostic left breast ultrasound performed on 2023 at [facility] to follow-up on an upper outer quadrant 2:00 likely benign density 5 cm from the nipple seen on an ultrasound from [place] in May 2023 and this cystic density was stable ????????.  She should continue follow-up with Dr. Morrison and remain on tamoxifen every other day due to some cramping side effects.

## 2024-04-26 NOTE — PHYSICAL EXAM
[Normocephalic] : normocephalic [Atraumatic] : atraumatic [EOMI] : extra ocular movement intact [Supple] : supple [No Supraclavicular Adenopathy] : no supraclavicular adenopathy [No Cervical Adenopathy] : no cervical adenopathy [Examined in the supine and seated position] : examined in the supine and seated position [No dominant masses] : no dominant masses in right breast  [No dominant masses] : no dominant masses left breast [No Nipple Retraction] : no left nipple retraction [No Nipple Discharge] : no left nipple discharge [Breast Mass Right Breast ___cm] : no masses [Breast Mass Left Breast ___cm] : no masses [No Axillary Lymphadenopathy] : no left axillary lymphadenopathy [No Edema] : no edema [No Rashes] : no rashes [No Ulceration] : no ulceration [Breast Nipple Inversion] : nipples not inverted [Breast Nipple Retraction] : nipples not retracted [Breast Nipple Flattening] : nipples not flattened [Breast Nipple Fissures] : nipples not fissured [Breast Abnormal Lactation (Galactorrhea)] : no galactorrhea [Breast Abnormal Secretion Bloody Fluid] : no bloody discharge [Breast Abnormal Secretion Serous Fluid] : no serous discharge [Breast Abnormal Secretion Opalescent Fluid] : no milky discharge [de-identified] : On exam, the patient has done well after her right breast partial mastectomy with sentinel lymph node biopsy and tissue rearrangement closure and later reexcision of an anterior margin.  She has no evidence of recurrence and did well after radiation therapy.  She has no axillary, supraclavicular, or cervical adenopathy. [de-identified] : Status post bracketed partial mastectomy with tissue rearrangement closure by plastic surgery and right axillary sentinel lymph node biopsy.  Further reexcision of anterior margin was negative and she underwent external beam radiation.  She has no evidence of recurrence.

## 2024-05-13 ENCOUNTER — APPOINTMENT (OUTPATIENT)
Dept: BREAST CENTER | Facility: CLINIC | Age: 49
End: 2024-05-13
Payer: COMMERCIAL

## 2024-05-13 VITALS
OXYGEN SATURATION: 98 % | HEIGHT: 61 IN | SYSTOLIC BLOOD PRESSURE: 126 MMHG | HEART RATE: 100 BPM | DIASTOLIC BLOOD PRESSURE: 82 MMHG | WEIGHT: 200 LBS | BODY MASS INDEX: 37.76 KG/M2

## 2024-05-13 DIAGNOSIS — Z90.11 ACQUIRED ABSENCE OF RIGHT BREAST AND NIPPLE: ICD-10-CM

## 2024-05-13 DIAGNOSIS — Z85.3 PERSONAL HISTORY OF MALIGNANT NEOPLASM OF BREAST: ICD-10-CM

## 2024-05-13 DIAGNOSIS — R92.30 DENSE BREASTS, UNSPECIFIED: ICD-10-CM

## 2024-05-13 DIAGNOSIS — Z12.39 ENCOUNTER FOR OTHER SCREENING FOR MALIGNANT NEOPLASM OF BREAST: ICD-10-CM

## 2024-05-13 PROCEDURE — 99213 OFFICE O/P EST LOW 20 MIN: CPT

## 2024-05-13 NOTE — REASON FOR VISIT
[Follow-Up: _____] : a [unfilled] follow-up visit [FreeTextEntry1] : The patient is a perimenopausal white female of Polish and Mongolian descent with a strong family history of breast cancer who was found to have suspicious calcifications in her right breast upper outer quadrant on screening mammography and stereotactic biopsy in May 2023 showed high-grade DCIS with comedonecrosis which was ER/AL positive.  MRI performed in May 2023 showed the biopsy-proven 12:00 DCIS but significant non-mass-like enhancement extending 10 cm posteriorly and separate more posterior MRI guided core biopsy performed in June 2023 showed more extensive DCIS again in this 12:00 but posterior region.  A right breast bracketed partial mastectomy with sentinel lymph node biopsy and tissue rearrangement closure by Dr. Bates was performed on June 23, 2023.  Final pathology showed intermediate to high-grade DCIS with 3 sentinel lymph nodes with 1 having some rare isolated tumor cells but there was no invasive cancer and margins were free but she did have an anterior margin less than 1 mm.  She underwent reexcision of the anterior margin on July 17, 2023 which was benign just showing some usual duct hyperplasia and scarring changes.  She was seen by Dr. Morrison and endocrine therapy was recommended and she underwent external beam radiation through Dr. Spears.  She comes in now for routine follow-up.

## 2024-05-13 NOTE — PHYSICAL EXAM
[Atraumatic] : atraumatic [Normocephalic] : normocephalic [EOMI] : extra ocular movement intact [Supple] : supple [No Supraclavicular Adenopathy] : no supraclavicular adenopathy [No Cervical Adenopathy] : no cervical adenopathy [Examined in the supine and seated position] : examined in the supine and seated position [No dominant masses] : no dominant masses in right breast  [No dominant masses] : no dominant masses left breast [No Nipple Retraction] : no left nipple retraction [No Nipple Discharge] : no left nipple discharge [Breast Mass Right Breast ___cm] : no masses [Breast Mass Left Breast ___cm] : no masses [No Axillary Lymphadenopathy] : no left axillary lymphadenopathy [No Edema] : no edema [No Rashes] : no rashes [No Ulceration] : no ulceration [Breast Nipple Inversion] : nipples not inverted [Breast Nipple Retraction] : nipples not retracted [Breast Nipple Flattening] : nipples not flattened [Breast Nipple Fissures] : nipples not fissured [Breast Abnormal Lactation (Galactorrhea)] : no galactorrhea [Breast Abnormal Secretion Bloody Fluid] : no bloody discharge [Breast Abnormal Secretion Serous Fluid] : no serous discharge [Breast Abnormal Secretion Opalescent Fluid] : no milky discharge [de-identified] : On exam, the patient has done well after her right breast partial mastectomy with sentinel lymph node biopsy and tissue rearrangement closure and later reexcision of an anterior margin.  She has no evidence of recurrence and did well after radiation therapy.  She has no axillary, supraclavicular, or cervical adenopathy. [de-identified] : Status post bracketed partial mastectomy with tissue rearrangement closure by plastic surgery and right axillary sentinel lymph node biopsy.  Further reexcision of anterior margin was negative and she underwent external beam radiation.  She has no evidence of recurrence.

## 2024-05-13 NOTE — HISTORY OF PRESENT ILLNESS
[FreeTextEntry1] : The patient is a 48-year-old  perimenopausal white female of [place] and [place] descent.  She underwent menarche at age 14 and had her first child at age 31.  Her menses are somewhat irregular and she is having some vasomotor symptoms.  She has a strong family history with her maternal grandmother who had breast cancer at age 77 and her paternal grandmother had breast cancer in her 60s.  She has a maternal great-grandmother who also had breast cancer at an unknown age.  The patient underwent a screening mammography at [facility] on 2023 showing some new suspicious calcifications in her right breast upper outer quadrant.  Diagnostic mammography in 2023 also showed these calcifications and she underwent a stereotactic core biopsy on May 3, 2023 which showed extensive high-grade DCIS with comedonecrosis associated with calcifications which was ER positive at 100% and NH positive at 5% ("ar" clip).  She sought other surgical opinions.  She underwent a bilateral breast MRI on May 24, 2023 showing the biopsy-proven DCIS in the right breast 12:00 region with significant clumped non-mass-like enhancement extending posteriorly 10.3 cm for which a separate MRI guided core biopsy was recommended.  She also had some findings in the right lobe of the liver.  Abdominal MRI showed this right hepatic lobe density to be consistent with a hemangioma.  Separate posterior right breast 12:00 MRI guided core biopsy was performed on 2023 at [place] which showed more extensive DCIS.  She was still felt to be a candidate for partial mastectomy and opted on breast conservation and was seen by Dr. Bates preoperatively who felt she is a good candidate for a reduction mastopexy versus tissue rearrangement.  She underwent a right breast bracketed partial mastectomy with tissue rearrangement closure and sentinel lymph node biopsy performed on 2023 with Dr. Bates.  The final pathology showed intermediate to high-grade DCIS and there were 3 sentinel lymph nodes with 1 having some rare isolated tumor cells.  This would be considered node-negative and still considered stage 0.  There was no invasive cancer but anterior margin was less than 1 mm.  I took her back to the operating room for reexcision of the anterior margin on 2023 and the final pathology was benign just showing some usual duct hyperplasia and fat necrosis.  She was seen by Dr. Morrison and endocrine therapy with tamoxifen was initially started at 20 mg a day but she developed severe leg cramping and is currently taking 10 mg every other day.  She was seen by Dr. Spears and underwent external beam radiation which was completed on 2023.  She comes in now for routine follow-up.

## 2024-05-13 NOTE — ASSESSMENT
[FreeTextEntry1] : The patient is a 48-year-old  perimenopausal white female of [place] and [place] descent. She underwent menarche at age 14 and had her first child at age 31. Her menses are somewhat irregular and she is having some vasomotor symptoms. She has a strong family history with her maternal grandmother who had breast cancer at age 77 and her paternal grandmother had breast cancer in her 60s. She has a maternal great-grandmother who also had breast cancer at an unknown age. The patient underwent a screening mammography at [facility] on 2023 showing some new suspicious calcifications in her right breast upper outer quadrant. Diagnostic mammography on 2023 also showed these calcifications and she underwent a stereotactic core biopsy on May 3, 2023 which showed extensive high-grade DCIS with comedonecrosis associated with calcifications which was ER positive at 100% and HI positive at 5% ("ar" clip). I reviewed her imaging on CD-ROM and indeed she has the suspicious right breast upper outer quadrant calcifications with another area of calcifications about 1 cm medial to the biopsied region. I do not see any suspicious findings in the left breast. On exam she just has postbiopsy changes in the right breast upper outer quadrant and no suspicious adenopathy. I spoke to the patient with her entire family with her  as well as mother and father involved in the discussion. She understands that this is a stage 0 cancer at this point but is high-grade and has a chance of having some associated invasive component. I also spoke to her about the possible need for a lymph node evaluation with a sentinel lymph node biopsy and recommended it at the same time of the partial mastectomy due to a possible 20 to 30% risk of being upstaged to an invasive cancer and I recommended the sentinel node biopsy at the time of surgery. Genetic panel testing performed at [facility] was reportedly negative. She underwent a bilateral breast MRI on May 24, 2023 showing the biopsy-proven DCIS in the right breast 12:00 region with significant clumped non-mass-like enhancement extending posteriorly 10.3 cm for which a separate MRI guided core biopsy was recommended. She also had some findings in the right lobe of the liver. Abdominal MRI showed this right hepatic lobe density to be consistent with a hemangioma. Separate posterior right breast 12:00 MRI guided core biopsy was performed on 2023 at [place] which showed more extensive DCIS. She was still felt to be a candidate for partial mastectomy and opted on breast conservation and was seen by Dr. Bates preoperatively who felt she is a good candidate for a reduction mastopexy versus tissue rearrangement. She underwent a right breast bracketed partial mastectomy with tissue rearrangement closure and sentinel lymph node biopsy performed on 2023 with Dr. Bates. The final pathology showed intermediate to high-grade DCIS and there were 3 sentinel lymph nodes with 1 having some rare isolated tumor cells. This would be considered node-negative and still considered stage 0. There was no invasive cancer but anterior margin was less than 1 mm. I went over the pathology with the patient and advised her to undergo reexcision of the anterior margin which was performed on 2023. She did have a recurrent seroma at the time of the surgery and I performed a tissue rearrangement closure to close off the seroma cavity at the time of the reexcision. Final pathology showed some usual duct hyperplasia and fat necrosis.  She was seen by Dr. Morrison and endocrine therapy with tamoxifen was initially started at 20 mg a day but she developed severe leg cramping and is currently on 10 mg every other day.  I did place fiducial titanium clips at the base of the excision to aid the radiation oncologist and she was seen by Dr. Spears and underwent external beam radiation which finished on 2023.  She underwent her last bilateral mammography and ultrasound which was reviewed from May 6, 2024 performed at [facility] just showing postoperative changes in the right breast but follow-up diagnostic right breast imaging was recommended in 6 months.  On exam today, she has healed well from her right breast partial mastectomy and tissue rearrangement with sentinel lymph node biopsy and then reexcision of the anterior margin on a separate date.  I would like to see her again in 6 months for routine follow-up.  She will be due for a diagnostic right breast mammography and ultrasound at that time around 2024 and her next bilateral mammography and ultrasound will be due in May 2025 and she was given prescriptions.   She should continue follow-up with Dr. Morrison and remain on tamoxifen every other day due to some cramping side effects.

## 2024-07-31 ENCOUNTER — APPOINTMENT (OUTPATIENT)
Dept: RADIATION ONCOLOGY | Facility: CLINIC | Age: 49
End: 2024-07-31
Payer: COMMERCIAL

## 2024-07-31 VITALS
OXYGEN SATURATION: 98 % | SYSTOLIC BLOOD PRESSURE: 133 MMHG | RESPIRATION RATE: 18 BRPM | DIASTOLIC BLOOD PRESSURE: 83 MMHG | HEART RATE: 101 BPM

## 2024-07-31 DIAGNOSIS — D05.11 INTRADUCTAL CARCINOMA IN SITU OF RIGHT BREAST: ICD-10-CM

## 2024-07-31 PROCEDURE — G2211 COMPLEX E/M VISIT ADD ON: CPT | Mod: NC

## 2024-07-31 PROCEDURE — 99214 OFFICE O/P EST MOD 30 MIN: CPT

## 2024-08-02 ENCOUNTER — NON-APPOINTMENT (OUTPATIENT)
Age: 49
End: 2024-08-02

## 2024-08-05 NOTE — CONSULT LETTER
[Dear  ___] : Dear  [unfilled], [Consult Letter:] : I had the pleasure of evaluating your patient, [unfilled]. [Please see my note below.] : Please see my note below. [Consult Closing:] : Thank you very much for allowing me to participate in the care of this patient.  If you have any questions, please do not hesitate to contact me. [Sincerely,] : Sincerely, [FreeTextEntry3] : Mae Morrison DO, FACJOHANA, FACP Medical Oncology and Hematology [facility]

## 2024-08-06 ENCOUNTER — NON-APPOINTMENT (OUTPATIENT)
Age: 49
End: 2024-08-06

## 2024-08-08 NOTE — DISEASE MANAGEMENT
[Pathological] : TNM Stage: p [0] : 0 [FreeTextEntry4] : ER/[place] + DCIS with ITC 1/3 Lymph nodes [TTNM] : IS [NTNM] : 0 [MTNM] : X

## 2024-08-08 NOTE — DISEASE MANAGEMENT
[Pathological] : TNM Stage: p [0] : 0 [FreeTextEntry4] : ER/[place] + DCIS with ITC 1/3 Lymph nodes [TTNM] : IS [NTNM] : 0 [MTNM] : X

## 2024-08-08 NOTE — HISTORY OF PRESENT ILLNESS
[FreeTextEntry1] : Ms. Coronado is a 48-year-old female who was diagnosed with ER/[place] + DCIS of the right breast s/p lumpectomy and adjuvant radiation therapy. She is present for her routine follow-up.   Ms. Coronado underwent a screening mammogram on 4/13/23: which revealed questionable calcifications in the right breast.  Diagnostic mammo performed on 4/26/23 revealed 2 adjacent clusters of indeterminate calcifications extending over 1.5 cm in the upper right breast.  On 5/3/23 a right stereotactic biopsy was performed that revealed extensive ductal carcinoma in situ, high-grade, with comedonecrosis, calcifications and cancerization of lobules. Incidental minute intraductal papilloma. Cysts with apocrine metaplasis. ER/[place] +. On 5/19/23, a stereotactic biopsy left breast was also performed that revealed:  fibrocystic changes with occasional calcifications in the upper outer breast at 1:30 7 cm from the nipple.   5/23/23 MRI Breast:  1. Biopsy-proven ductal carcinoma in situ at the right 12:00 axis, anterior depth. Extensive suspicious clumped non-mass enhancement was seen at the site of biopsy extending posterior from the site of biopsy, measuring 10.3 cm in AP dimension. Of note, suspicious enhancement in this location was markedly larger in size than calcifications at the right 12:00 axis on mammography.  2. Susceptibility artifact from a biopsy marker is seen in the central outer left breast, at the site of benign biopsy. Mild enhancement at the site of biopsy likely represents postprocedural change.  3. T2 hyperintense lesion in the right lobe of liver. Although this finding likely represents a cyst versus hemangioma, contrast-enhanced breast MRI was recommended to exclude metastatic disease in the setting of known breast cancer.  On 6/6/23 an additional MRI-guided biopsy right breast was performed at the 12:00 axis and revealed ductal carcinoma in situ with prominent clear cell change. Solid and papillary architecture. Intermediate nuclear grade. Negative for necrosis. Involved multiple cores. With extension into lobules. With pagetoid extension along ducts. ER/[place] +.   On 6/23/23 Ms. Coronado underwent a right partial mastectomy with sentinel lymph node biopsy performed by Dr. Kelly. Pathology revealed DCIS ~75 mm. Cribriform and solid patterning with central necrosis and microcalcifications. Nuclear grade II-III. The closest margin was the anterior margin at less than 1 mm. 1/3 lymph nodes were positive for isolated tumor cells. ER/KS+.  7/17/23 Ms. Coronado underwent an additional right anterior margin excision. Pathology revealed benign breast parenchyma with surgical site changes, duct ectasia, chronic inflammation, fat necrosis, and usual ductal hyperplasia.   Ms. Coronado was treated with adjuvant radiation therapy to the right breast and axilla to a total of 5240 cGy (including boost) completed on 10/17/2023.   Ms. Coronado presents for her routine follow-up status post completion of radiation.  She offers no new radiation related side effects or breast concerns. She continues to take Tamoxifen. She is scheduled for follow-up by Dr. Morrison in August.  She is scheduled for follow-up Dr. Kelly in November 2024.  She had mammogram and US 5/6/24: New lumpectomy changes in the right breast. 6-month f/u mammogram and US recommended.  Overall, she is doing well but she is still very anxious about the breast cancer diagnosis.

## 2024-08-08 NOTE — PHYSICAL EXAM
[Normal] : oriented to person, place and time, the affect was normal, the mood was normal and not anxious [de-identified] : s/p right partial mastectomy, mild persistent hyperpigmentation.

## 2024-08-08 NOTE — HISTORY OF PRESENT ILLNESS
[FreeTextEntry1] : Ms. Coronado is a 48-year-old female who was diagnosed with ER/[place] + DCIS of the right breast s/p lumpectomy and adjuvant radiation therapy. She is present for her routine follow-up.   Ms. Coronado underwent a screening mammogram on 4/13/23: which revealed questionable calcifications in the right breast.  Diagnostic mammo performed on 4/26/23 revealed 2 adjacent clusters of indeterminate calcifications extending over 1.5 cm in the upper right breast.  On 5/3/23 a right stereotactic biopsy was performed that revealed extensive ductal carcinoma in situ, high-grade, with comedonecrosis, calcifications and cancerization of lobules. Incidental minute intraductal papilloma. Cysts with apocrine metaplasis. ER/[place] +. On 5/19/23, a stereotactic biopsy left breast was also performed that revealed:  fibrocystic changes with occasional calcifications in the upper outer breast at 1:30 7 cm from the nipple.   5/23/23 MRI Breast:  1. Biopsy-proven ductal carcinoma in situ at the right 12:00 axis, anterior depth. Extensive suspicious clumped non-mass enhancement was seen at the site of biopsy extending posterior from the site of biopsy, measuring 10.3 cm in AP dimension. Of note, suspicious enhancement in this location was markedly larger in size than calcifications at the right 12:00 axis on mammography.  2. Susceptibility artifact from a biopsy marker is seen in the central outer left breast, at the site of benign biopsy. Mild enhancement at the site of biopsy likely represents postprocedural change.  3. T2 hyperintense lesion in the right lobe of liver. Although this finding likely represents a cyst versus hemangioma, contrast-enhanced breast MRI was recommended to exclude metastatic disease in the setting of known breast cancer.  On 6/6/23 an additional MRI-guided biopsy right breast was performed at the 12:00 axis and revealed ductal carcinoma in situ with prominent clear cell change. Solid and papillary architecture. Intermediate nuclear grade. Negative for necrosis. Involved multiple cores. With extension into lobules. With pagetoid extension along ducts. ER/[place] +.   On 6/23/23 Ms. Coronado underwent a right partial mastectomy with sentinel lymph node biopsy performed by Dr. Kelly. Pathology revealed DCIS ~75 mm. Cribriform and solid patterning with central necrosis and microcalcifications. Nuclear grade II-III. The closest margin was the anterior margin at less than 1 mm. 1/3 lymph nodes were positive for isolated tumor cells. ER/[place]+.  7/17/23 Ms. Coronado underwent an additional right anterior margin excision. Pathology revealed benign breast parenchyma with surgical site changes, duct ectasia, chronic inflammation, fat necrosis, and usual ductal hyperplasia.   Ms. Coronado was treated with adjuvant radiation therapy to the right breast and axilla to a total of 5240 cGy (including boost) completed on 10/17/2023.   Ms. Coronado presents for her routine follow-up status post completion of radiation.  She offers no new radiation related side effects or breast concerns. She continues to take Tamoxifen. She is scheduled for follow-up by Dr. Morrison in August.  She is scheduled for follow-up Dr. Kelly in November 2024.  She had mammogram and US 5/6/24: New lumpectomy changes in the right breast. 6-month f/u mammogram and US recommended.  Overall, she is doing well but she is still very anxious about the breast cancer diagnosis.

## 2024-08-08 NOTE — DISEASE MANAGEMENT
[Pathological] : TNM Stage: p [0] : 0 [FreeTextEntry4] : ER/[place] + DCIS with ITC 1/3 Lymph nodes [TTNM] : IS [NTNM] : 0 [MTNM] : X

## 2024-08-08 NOTE — DISEASE MANAGEMENT
[Pathological] : TNM Stage: p [0] : 0 [FreeTextEntry4] : ER/[place] + DCIS with ITC 1/3 Lymph nodes [TTNM] : IS [NTNM] : 0 [MTNM] : X

## 2024-08-08 NOTE — HISTORY OF PRESENT ILLNESS
[FreeTextEntry1] : Ms. Coronado is a 48-year-old female who was diagnosed with ER/DC + DCIS of the right breast s/p lumpectomy and adjuvant radiation therapy. She is present for her routine follow-up.   Ms. Coronado underwent a screening mammogram on 4/13/23: which revealed questionable calcifications in the right breast.  Diagnostic mammo performed on 4/26/23 revealed 2 adjacent clusters of indeterminate calcifications extending over 1.5 cm in the upper right breast.  On 5/3/23 a right stereotactic biopsy was performed that revealed extensive ductal carcinoma in situ, high-grade, with comedonecrosis, calcifications and cancerization of lobules. Incidental minute intraductal papilloma. Cysts with apocrine metaplasis. ER/DC +. On 5/19/23, a stereotactic biopsy left breast was also performed that revealed:  fibrocystic changes with occasional calcifications in the upper outer breast at 1:30 7 cm from the nipple.   5/23/23 MRI Breast:  1. Biopsy-proven ductal carcinoma in situ at the right 12:00 axis, anterior depth. Extensive suspicious clumped non-mass enhancement was seen at the site of biopsy extending posterior from the site of biopsy, measuring 10.3 cm in AP dimension. Of note, suspicious enhancement in this location was markedly larger in size than calcifications at the right 12:00 axis on mammography.  2. Susceptibility artifact from a biopsy marker is seen in the central outer left breast, at the site of benign biopsy. Mild enhancement at the site of biopsy likely represents postprocedural change.  3. T2 hyperintense lesion in the right lobe of liver. Although this finding likely represents a cyst versus hemangioma, contrast-enhanced breast MRI was recommended to exclude metastatic disease in the setting of known breast cancer.  On 6/6/23 an additional MRI-guided biopsy right breast was performed at the 12:00 axis and revealed ductal carcinoma in situ with prominent clear cell change. Solid and papillary architecture. Intermediate nuclear grade. Negative for necrosis. Involved multiple cores. With extension into lobules. With pagetoid extension along ducts. ER/DC +.   On 6/23/23 Ms. Coronado underwent a right partial mastectomy with sentinel lymph node biopsy performed by Dr. Kelly. Pathology revealed DCIS ~75 mm. Cribriform and solid patterning with central necrosis and microcalcifications. Nuclear grade II-III. The closest margin was the anterior margin at less than 1 mm. 1/3 lymph nodes were positive for isolated tumor cells. ER/DC+.  7/17/23 Ms. Coronado underwent an additional right anterior margin excision. Pathology revealed benign breast parenchyma with surgical site changes, duct ectasia, chronic inflammation, fat necrosis, and usual ductal hyperplasia.   Ms. Coronado was treated with adjuvant radiation therapy to the right breast and axilla to a total of 5240 cGy (including boost) completed on 10/17/2023.   Ms. Coronado presents for her routine follow-up status post completion of radiation.  She offers no new radiation related side effects or breast concerns. She continues to take Tamoxifen. She is scheduled for follow-up by Dr. Morrison in August.  She is scheduled for follow-up Dr. Kelly in November 2024.  She had mammogram and US 5/6/24: New lumpectomy changes in the right breast. 6-month f/u mammogram and US recommended.  Overall, she is doing well but she is still very anxious about the breast cancer diagnosis.

## 2024-08-08 NOTE — HISTORY OF PRESENT ILLNESS
[FreeTextEntry1] : Ms. Coronado is a 48-year-old female who was diagnosed with ER/[place] + DCIS of the right breast s/p lumpectomy and adjuvant radiation therapy. She is present for her routine follow-up.   Ms. Coronado underwent a screening mammogram on 4/13/23: which revealed questionable calcifications in the right breast.  Diagnostic mammo performed on 4/26/23 revealed 2 adjacent clusters of indeterminate calcifications extending over 1.5 cm in the upper right breast.  On 5/3/23 a right stereotactic biopsy was performed that revealed extensive ductal carcinoma in situ, high-grade, with comedonecrosis, calcifications and cancerization of lobules. Incidental minute intraductal papilloma. Cysts with apocrine metaplasis. ER/RI +. On 5/19/23, a stereotactic biopsy left breast was also performed that revealed:  fibrocystic changes with occasional calcifications in the upper outer breast at 1:30 7 cm from the nipple.   5/23/23 MRI Breast:  1. Biopsy-proven ductal carcinoma in situ at the right 12:00 axis, anterior depth. Extensive suspicious clumped non-mass enhancement was seen at the site of biopsy extending posterior from the site of biopsy, measuring 10.3 cm in AP dimension. Of note, suspicious enhancement in this location was markedly larger in size than calcifications at the right 12:00 axis on mammography.  2. Susceptibility artifact from a biopsy marker is seen in the central outer left breast, at the site of benign biopsy. Mild enhancement at the site of biopsy likely represents postprocedural change.  3. T2 hyperintense lesion in the right lobe of liver. Although this finding likely represents a cyst versus hemangioma, contrast-enhanced breast MRI was recommended to exclude metastatic disease in the setting of known breast cancer.  On 6/6/23 an additional MRI-guided biopsy right breast was performed at the 12:00 axis and revealed ductal carcinoma in situ with prominent clear cell change. Solid and papillary architecture. Intermediate nuclear grade. Negative for necrosis. Involved multiple cores. With extension into lobules. With pagetoid extension along ducts. ER/RI +.   On 6/23/23 Ms. Coronado underwent a right partial mastectomy with sentinel lymph node biopsy performed by Dr. Kelly. Pathology revealed DCIS ~75 mm. Cribriform and solid patterning with central necrosis and microcalcifications. Nuclear grade II-III. The closest margin was the anterior margin at less than 1 mm. 1/3 lymph nodes were positive for isolated tumor cells. ER/RI+.  7/17/23 Ms. Coronado underwent an additional right anterior margin excision. Pathology revealed benign breast parenchyma with surgical site changes, duct ectasia, chronic inflammation, fat necrosis, and usual ductal hyperplasia.   Ms. Coronado was treated with adjuvant radiation therapy to the right breast and axilla to a total of 5240 cGy (including boost) completed on 10/17/2023.   Ms. Coronado presents for her routine follow-up status post completion of radiation.  She offers no new radiation related side effects or breast concerns. She continues to take Tamoxifen. She is scheduled for follow-up by Dr. Morrison in August.  She is scheduled for follow-up Dr. Kelly in November 2024.  She had mammogram and US 5/6/24: New lumpectomy changes in the right breast. 6-month f/u mammogram and US recommended.  Overall, she is doing well but she is still very anxious about the breast cancer diagnosis.

## 2024-08-08 NOTE — PHYSICAL EXAM
[Normal] : oriented to person, place and time, the affect was normal, the mood was normal and not anxious [de-identified] : s/p right partial mastectomy, mild persistent hyperpigmentation.

## 2024-08-08 NOTE — PHYSICAL EXAM
[Normal] : oriented to person, place and time, the affect was normal, the mood was normal and not anxious [de-identified] : s/p right partial mastectomy, mild persistent hyperpigmentation.

## 2024-08-08 NOTE — PHYSICAL EXAM
[Normal] : oriented to person, place and time, the affect was normal, the mood was normal and not anxious [de-identified] : s/p right partial mastectomy, mild persistent hyperpigmentation.

## 2024-08-12 ENCOUNTER — RESULT REVIEW (OUTPATIENT)
Age: 49
End: 2024-08-12

## 2024-08-12 ENCOUNTER — APPOINTMENT (OUTPATIENT)
Dept: HEMATOLOGY ONCOLOGY | Facility: CLINIC | Age: 49
End: 2024-08-12
Payer: COMMERCIAL

## 2024-08-12 VITALS
HEART RATE: 93 BPM | RESPIRATION RATE: 16 BRPM | DIASTOLIC BLOOD PRESSURE: 60 MMHG | SYSTOLIC BLOOD PRESSURE: 111 MMHG | TEMPERATURE: 97.3 F | WEIGHT: 200 LBS | HEIGHT: 61 IN | BODY MASS INDEX: 37.76 KG/M2 | OXYGEN SATURATION: 97 %

## 2024-08-12 DIAGNOSIS — D05.11 INTRADUCTAL CARCINOMA IN SITU OF RIGHT BREAST: ICD-10-CM

## 2024-08-12 PROCEDURE — 36415 COLL VENOUS BLD VENIPUNCTURE: CPT

## 2024-08-12 PROCEDURE — 99214 OFFICE O/P EST MOD 30 MIN: CPT | Mod: 25

## 2024-08-12 RX ORDER — MULTIVITAMIN
CAPSULE ORAL
Refills: 0 | Status: ACTIVE | COMMUNITY

## 2024-08-13 NOTE — ASSESSMENT
[FreeTextEntry1] : #DCIS - ER+, VA+ - s/p completion RT  - 12/20/23 vs and CBC reviewed; WBC 5.73, hgb 12.6, plt 323. s/p completion of RT. Started on tamoxifen 20mg daily with severe leg cramping and lowered to 20mg every other day. She still has leg cramping but somehwat improved. Continues to have hotflashes, asking to be switched to lower dose as her s/s are still bothersome. Advised standard dosing is 20mg dialy however reviewed guidelines revealing when using tamoxifen for DCIS, we administer it at the standard dose of 20 mg orally daily, which was the dose evaluated in the clinical trials. However, for women with DCIS who would otherwise discontinue endocrine therapy due to substantial toxicities, we offer lower-dose tamoxifen (10 mg every other day). She will try this. s/p follow up with Dr. Spears and Dr. Kelly 11/2023 notes reviewed. s/p mammo 11/2023 at [facility]. Obtain records  - 4/8/24 - vs and labs reviewed. labs drawn in office today. wbc 5.64, hgb 13.2, plt 319 continue tamoxifen every other day. she is requesting tumor markers. ordered. follow up with Dr. Kelly 5/24. bilateral mammography and ultrasound in April 2024. Reviewed note by Dr. Spears 3/24 - 8/12/24 vs and labs reviewed; continue tamoxifen 10mg every other day. R breast imaging due 11/2024. s/p follow ups with Dr. Spears and Dr. Kelly, notes reviewed.   #Hip pain bilaterally - s/p XR 5/2024 revealing No fracture, arthritis, or metastatic disease. If occult bony mets are suspected consider further eval with non contrast MRI. Patient states she still has intermittent pain when laying the side and is about 5/10 when she does have the pain. Will check MRI   #Leg Cramping and Hotflashes  - As above, lower dose to 10mg every other day   #Vit D Deficiency  - Check levels today - vit D 2000 IU daily  #Health Maintenance  - GYN Dr. Jang will be undergoing TVUS due to being on Tamoxifen as baseline did not get TVUS but has upcoming appt  - PCP Dr. Mejia  - Mammo 5/2024 BIRADS 3 due for R breast imaging 11/2024  - [facility] - 3/11/24 - normal recommend 5 years - 2029  RTC in 4 months with cbc with diff, cmp, vit D, b12, zinc, iron, ferritin, ca 15.3, cea (per patient's request for tumor markers)  Case and management discussed with DR. Morrison

## 2024-08-13 NOTE — PHYSICAL EXAM
[Fully active, able to carry on all pre-disease performance without restriction] : Status 0 - Fully active, able to carry on all pre-disease performance without restriction [Normal] : affect appropriate [de-identified] : PATIENT REFUSED BREAST EXAM

## 2024-08-13 NOTE — REVIEW OF SYSTEMS
[Diarrhea: Grade 0] : Diarrhea: Grade 0 [Skin Rash] : skin rash [Hot Flashes] : hot flashes [de-identified] : noticed some blemishing over the last week on upper left extremity [Joint Pain] : joint pain [Negative] : Integumentary [FreeTextEntry9] : hip pain

## 2024-08-13 NOTE — REVIEW OF SYSTEMS
[Diarrhea: Grade 0] : Diarrhea: Grade 0 [Skin Rash] : skin rash [Hot Flashes] : hot flashes [de-identified] : noticed some blemishing over the last week on upper left extremity [Joint Pain] : joint pain [Negative] : Integumentary [FreeTextEntry9] : hip pain

## 2024-08-13 NOTE — ASSESSMENT
[FreeTextEntry1] : #DCIS - ER+, ID+ - s/p completion RT  - 12/20/23 vs and CBC reviewed; WBC 5.73, hgb 12.6, plt 323. s/p completion of RT. Started on tamoxifen 20mg daily with severe leg cramping and lowered to 20mg every other day. She still has leg cramping but somehwat improved. Continues to have hotflashes, asking to be switched to lower dose as her s/s are still bothersome. Advised standard dosing is 20mg dialy however reviewed guidelines revealing when using tamoxifen for DCIS, we administer it at the standard dose of 20 mg orally daily, which was the dose evaluated in the clinical trials. However, for women with DCIS who would otherwise discontinue endocrine therapy due to substantial toxicities, we offer lower-dose tamoxifen (10 mg every other day). She will try this. s/p follow up with Dr. Spears and Dr. Kelly 11/2023 notes reviewed. s/p mammo 11/2023 at [facility]. Obtain records  - 4/8/24 - vs and labs reviewed. labs drawn in office today. wbc 5.64, hgb 13.2, plt 319 continue tamoxifen every other day. she is requesting tumor markers. ordered. follow up with Dr. Kelly 5/24. bilateral mammography and ultrasound in April 2024. Reviewed note by Dr. Spears 3/24 - 8/12/24 vs and labs reviewed; continue tamoxifen 10mg every other day. R breast imaging due 11/2024. s/p follow ups with Dr. Spears and Dr. Kelly, notes reviewed.   #Hip pain bilaterally - s/p XR 5/2024 revealing No fracture, arthritis, or metastatic disease. If occult bony mets are suspected consider further eval with non contrast MRI. Patient states she still has intermittent pain when laying the side and is about 5/10 when she does have the pain. Will check MRI   #Leg Cramping and Hotflashes  - As above, lower dose to 10mg every other day   #Vit D Deficiency  - Check levels today - vit D 2000 IU daily  #Health Maintenance  - GYN Dr. Jang will be undergoing TVUS due to being on Tamoxifen as baseline did not get TVUS but has upcoming appt  - PCP Dr. Mejia  - Mammo 5/2024 BIRADS 3 due for R breast imaging 11/2024  - [facility] - 3/11/24 - normal recommend 5 years - 2029  RTC in 4 months with cbc with diff, cmp, vit D, b12, zinc, iron, ferritin, ca 15.3, cea (per patient's request for tumor markers)  Case and management discussed with DR. Morrison

## 2024-08-13 NOTE — HISTORY OF PRESENT ILLNESS
[de-identified] : Ms. Coronado is a 47-year-old  perimenopausal female with a PMH of HTN, HLD that presents for initial consultation referred by Dr. Kelly for newly diagnosed DCIS.   Oncological History:   23 s/p screening mammogram revealing new suspicious calcifications in her right breast upper outer quadrant.   23 s/p diagnostic mammography revealing calcifications.   5/3/23 s/p stereotactic core biopsy revealing extensive high-grade DCIS with comedonecrosis associated with calcifications which was ER positive at 100% and AL positive at 5%.   23 s/p MRI Breast revealing biopsy-proven DCIS in the right breast 12:00 region with significant clumped non-mass-like enhancement extending posteriorly 10.3 cm for which a separate MRI guided core biopsy was recommended. She also had some findings in the right lobe of the liver. Abdominal MRI showed this right hepatic lobe density to be consistent with a hemangioma.   23 s/p R breast MRI guided biopsy pathology revealing more extensive DCIS.   She was still felt to be a candidate for partial mastectomy and opted on breast conservation and was seen by Dr. Bates preoperatively who felt she is a good candidate for a reduction mastopexy versus tissue rearrangement.   23 s/p R breast partial mastectomy with tissue rearrangement closure with Dr. Bates and sentinel lymph node biopsy performed pathology revealing intermediate to high-grade DCIS and there were 3 sentinel lymph nodes with 1 having some rare, isolated tumor cells. No IDC. Anterior margin less than 1mm.   23 s/p re-excision and final pathology was benign just showing some usual duct hyperplasia and fat necrosis.   Of note, patient states she had another opinion with Dr. Au at [facility] and also had a MRI guided L breast biopsy which was benign .   Breast Cancer Risk Factors: Menarche: 14 Date of LMP: currently menstruating  Menopause: n/a  Age at first live birth: 31 Nursed: yes Hysterectomy: no Oophorectomy: no OCP: a few years HRT: none Last pap/pelvic exam: never abnormal, yearly with GYN Dr. Palacios Related family history: MGM breast cancer at age 77, PGM breast ca at age 62, maternal great grandmother breast cancer  Ashkenazi: no  BRCA testing: completed 2023 at [place] pending results     [de-identified] : Patient seen and examined for routine follow up for the management of DCIS. She remains on Tamoxifen 10mg every other day and is tolerating this better. She did have xray for bilateral hip pain at [facility] which revealing the hips remain intact. No fracture, arthritis, or metastatic disease. If occult bony mets are suspected consider further eval with non contrast MRI. Patient states she still has intermittent pain when laying the side and is about 5/10 when she does have the pain. Continues to follow with Dr. Kelly and Dr. Spears. Breast imaging due 11/2024 for R breast. She has upcoming appt with GYN did not have TVUS. LMP about 1 year ago.

## 2024-08-13 NOTE — HISTORY OF PRESENT ILLNESS
[de-identified] : Ms. Coronado is a 47-year-old  perimenopausal female with a PMH of HTN, HLD that presents for initial consultation referred by Dr. Kelly for newly diagnosed DCIS.   Oncological History:   23 s/p screening mammogram revealing new suspicious calcifications in her right breast upper outer quadrant.   23 s/p diagnostic mammography revealing calcifications.   5/3/23 s/p stereotactic core biopsy revealing extensive high-grade DCIS with comedonecrosis associated with calcifications which was ER positive at 100% and DE positive at 5%.   23 s/p MRI Breast revealing biopsy-proven DCIS in the right breast 12:00 region with significant clumped non-mass-like enhancement extending posteriorly 10.3 cm for which a separate MRI guided core biopsy was recommended. She also had some findings in the right lobe of the liver. Abdominal MRI showed this right hepatic lobe density to be consistent with a hemangioma.   23 s/p R breast MRI guided biopsy pathology revealing more extensive DCIS.   She was still felt to be a candidate for partial mastectomy and opted on breast conservation and was seen by Dr. Bates preoperatively who felt she is a good candidate for a reduction mastopexy versus tissue rearrangement.   23 s/p R breast partial mastectomy with tissue rearrangement closure with Dr. Bates and sentinel lymph node biopsy performed pathology revealing intermediate to high-grade DCIS and there were 3 sentinel lymph nodes with 1 having some rare, isolated tumor cells. No IDC. Anterior margin less than 1mm.   23 s/p re-excision and final pathology was benign just showing some usual duct hyperplasia and fat necrosis.   Of note, patient states she had another opinion with Dr. Au at [facility] and also had a MRI guided L breast biopsy which was benign .   Breast Cancer Risk Factors: Menarche: 14 Date of LMP: currently menstruating  Menopause: n/a  Age at first live birth: 31 Nursed: yes Hysterectomy: no Oophorectomy: no OCP: a few years HRT: none Last pap/pelvic exam: never abnormal, yearly with GYN Dr. Palacios Related family history: MGM breast cancer at age 77, PGM breast ca at age 62, maternal great grandmother breast cancer  Ashkenazi: no  BRCA testing: completed 2023 at [place] pending results     [de-identified] : Patient seen and examined for routine follow up for the management of DCIS. She remains on Tamoxifen 10mg every other day and is tolerating this better. She did have xray for bilateral hip pain at [facility] which revealing the hips remain intact. No fracture, arthritis, or metastatic disease. If occult bony mets are suspected consider further eval with non contrast MRI. Patient states she still has intermittent pain when laying the side and is about 5/10 when she does have the pain. Continues to follow with Dr. Kelly and Dr. Spears. Breast imaging due 11/2024 for R breast. She has upcoming appt with GYN did not have TVUS. LMP about 1 year ago.

## 2024-08-13 NOTE — PHYSICAL EXAM
[Fully active, able to carry on all pre-disease performance without restriction] : Status 0 - Fully active, able to carry on all pre-disease performance without restriction [Normal] : affect appropriate [de-identified] : PATIENT REFUSED BREAST EXAM

## 2024-10-29 ENCOUNTER — NON-APPOINTMENT (OUTPATIENT)
Age: 49
End: 2024-10-29

## 2024-11-16 ENCOUNTER — NON-APPOINTMENT (OUTPATIENT)
Age: 49
End: 2024-11-16

## 2024-12-06 ENCOUNTER — APPOINTMENT (OUTPATIENT)
Dept: BREAST CENTER | Facility: CLINIC | Age: 49
End: 2024-12-06
Payer: COMMERCIAL

## 2024-12-06 VITALS
WEIGHT: 200 LBS | OXYGEN SATURATION: 98 % | HEIGHT: 61 IN | SYSTOLIC BLOOD PRESSURE: 118 MMHG | HEART RATE: 101 BPM | BODY MASS INDEX: 37.76 KG/M2 | DIASTOLIC BLOOD PRESSURE: 77 MMHG

## 2024-12-06 DIAGNOSIS — Z90.11 ACQUIRED ABSENCE OF RIGHT BREAST AND NIPPLE: ICD-10-CM

## 2024-12-06 DIAGNOSIS — Z85.3 PERSONAL HISTORY OF MALIGNANT NEOPLASM OF BREAST: ICD-10-CM

## 2024-12-06 DIAGNOSIS — Z12.39 ENCOUNTER FOR OTHER SCREENING FOR MALIGNANT NEOPLASM OF BREAST: ICD-10-CM

## 2024-12-06 DIAGNOSIS — R92.30 DENSE BREASTS, UNSPECIFIED: ICD-10-CM

## 2024-12-06 PROCEDURE — 99213 OFFICE O/P EST LOW 20 MIN: CPT

## 2024-12-10 ENCOUNTER — NON-APPOINTMENT (OUTPATIENT)
Age: 49
End: 2024-12-10

## 2024-12-10 ENCOUNTER — APPOINTMENT (OUTPATIENT)
Dept: HEMATOLOGY ONCOLOGY | Facility: CLINIC | Age: 49
End: 2024-12-10

## 2024-12-11 ENCOUNTER — NON-APPOINTMENT (OUTPATIENT)
Age: 49
End: 2024-12-11

## 2024-12-11 ENCOUNTER — RESULT REVIEW (OUTPATIENT)
Age: 49
End: 2024-12-11

## 2024-12-11 ENCOUNTER — APPOINTMENT (OUTPATIENT)
Dept: HEMATOLOGY ONCOLOGY | Facility: CLINIC | Age: 49
End: 2024-12-11
Payer: COMMERCIAL

## 2024-12-11 VITALS
HEIGHT: 61 IN | OXYGEN SATURATION: 96 % | DIASTOLIC BLOOD PRESSURE: 64 MMHG | RESPIRATION RATE: 16 BRPM | HEART RATE: 107 BPM | TEMPERATURE: 97.1 F | BODY MASS INDEX: 40.82 KG/M2 | WEIGHT: 216.19 LBS | SYSTOLIC BLOOD PRESSURE: 106 MMHG

## 2024-12-11 DIAGNOSIS — D05.11 INTRADUCTAL CARCINOMA IN SITU OF RIGHT BREAST: ICD-10-CM

## 2024-12-11 PROCEDURE — 99204 OFFICE O/P NEW MOD 45 MIN: CPT | Mod: 25

## 2024-12-11 PROCEDURE — 36415 COLL VENOUS BLD VENIPUNCTURE: CPT

## 2025-01-30 ENCOUNTER — APPOINTMENT (OUTPATIENT)
Dept: RADIATION ONCOLOGY | Facility: CLINIC | Age: 50
End: 2025-01-30
Payer: COMMERCIAL

## 2025-01-30 VITALS
DIASTOLIC BLOOD PRESSURE: 76 MMHG | SYSTOLIC BLOOD PRESSURE: 123 MMHG | HEART RATE: 114 BPM | RESPIRATION RATE: 16 BRPM | WEIGHT: 216 LBS | OXYGEN SATURATION: 96 % | BODY MASS INDEX: 40.81 KG/M2

## 2025-01-30 PROCEDURE — G2211 COMPLEX E/M VISIT ADD ON: CPT | Mod: NC

## 2025-01-30 PROCEDURE — 99214 OFFICE O/P EST MOD 30 MIN: CPT

## 2025-05-05 ENCOUNTER — NON-APPOINTMENT (OUTPATIENT)
Age: 50
End: 2025-05-05

## 2025-05-08 ENCOUNTER — RESULT REVIEW (OUTPATIENT)
Age: 50
End: 2025-05-08

## 2025-05-08 ENCOUNTER — APPOINTMENT (OUTPATIENT)
Dept: HEMATOLOGY ONCOLOGY | Facility: CLINIC | Age: 50
End: 2025-05-08
Payer: COMMERCIAL

## 2025-05-08 VITALS
OXYGEN SATURATION: 98 % | HEART RATE: 109 BPM | HEIGHT: 61 IN | BODY MASS INDEX: 40.08 KG/M2 | WEIGHT: 212.31 LBS | DIASTOLIC BLOOD PRESSURE: 63 MMHG | SYSTOLIC BLOOD PRESSURE: 104 MMHG | RESPIRATION RATE: 16 BRPM | TEMPERATURE: 97.9 F

## 2025-05-08 DIAGNOSIS — D05.11 INTRADUCTAL CARCINOMA IN SITU OF RIGHT BREAST: ICD-10-CM

## 2025-05-08 PROCEDURE — G2211 COMPLEX E/M VISIT ADD ON: CPT | Mod: NC

## 2025-05-08 PROCEDURE — 99214 OFFICE O/P EST MOD 30 MIN: CPT

## 2025-05-08 PROCEDURE — 36415 COLL VENOUS BLD VENIPUNCTURE: CPT

## 2025-06-12 PROBLEM — Z12.31 BREAST CANCER SCREENING BY MAMMOGRAM: Status: ACTIVE | Noted: 2025-06-12

## 2025-06-17 ENCOUNTER — APPOINTMENT (OUTPATIENT)
Dept: BREAST CENTER | Facility: CLINIC | Age: 50
End: 2025-06-17
Payer: COMMERCIAL

## 2025-06-17 VITALS
HEIGHT: 61 IN | WEIGHT: 200 LBS | BODY MASS INDEX: 37.76 KG/M2 | OXYGEN SATURATION: 96 % | HEART RATE: 102 BPM | SYSTOLIC BLOOD PRESSURE: 131 MMHG | DIASTOLIC BLOOD PRESSURE: 82 MMHG

## 2025-06-17 PROCEDURE — 99213 OFFICE O/P EST LOW 20 MIN: CPT

## 2025-07-24 ENCOUNTER — APPOINTMENT (OUTPATIENT)
Dept: RADIATION ONCOLOGY | Facility: CLINIC | Age: 50
End: 2025-07-24
Payer: COMMERCIAL

## 2025-07-24 VITALS
DIASTOLIC BLOOD PRESSURE: 67 MMHG | HEART RATE: 100 BPM | SYSTOLIC BLOOD PRESSURE: 112 MMHG | RESPIRATION RATE: 18 BRPM | OXYGEN SATURATION: 98 %

## 2025-07-24 DIAGNOSIS — D05.11 INTRADUCTAL CARCINOMA IN SITU OF RIGHT BREAST: ICD-10-CM

## 2025-07-24 PROCEDURE — 99214 OFFICE O/P EST MOD 30 MIN: CPT

## 2025-07-24 PROCEDURE — G2211 COMPLEX E/M VISIT ADD ON: CPT | Mod: NC
